# Patient Record
Sex: MALE | Race: WHITE | NOT HISPANIC OR LATINO | Employment: FULL TIME | URBAN - NONMETROPOLITAN AREA
[De-identification: names, ages, dates, MRNs, and addresses within clinical notes are randomized per-mention and may not be internally consistent; named-entity substitution may affect disease eponyms.]

---

## 2017-02-02 ENCOUNTER — APPOINTMENT (OUTPATIENT)
Dept: OCCUPATIONAL MEDICINE | Facility: OTHER | Age: 32
End: 2017-02-02

## 2017-02-02 PROCEDURE — 99000 SPECIMEN HANDLING OFFICE-LAB: CPT

## 2018-02-26 ENCOUNTER — OFFICE VISIT (OUTPATIENT)
Dept: FAMILY MEDICINE | Facility: OTHER | Age: 33
End: 2018-02-26
Attending: NURSE PRACTITIONER
Payer: COMMERCIAL

## 2018-02-26 VITALS
TEMPERATURE: 96.6 F | HEART RATE: 61 BPM | WEIGHT: 224 LBS | HEIGHT: 70 IN | SYSTOLIC BLOOD PRESSURE: 110 MMHG | BODY MASS INDEX: 32.07 KG/M2 | OXYGEN SATURATION: 96 % | DIASTOLIC BLOOD PRESSURE: 66 MMHG

## 2018-02-26 DIAGNOSIS — J02.9 VIRAL PHARYNGITIS: Primary | ICD-10-CM

## 2018-02-26 LAB
DEPRECATED S PYO AG THROAT QL EIA: NORMAL
SPECIMEN SOURCE: NORMAL

## 2018-02-26 PROCEDURE — 87081 CULTURE SCREEN ONLY: CPT | Performed by: NURSE PRACTITIONER

## 2018-02-26 PROCEDURE — 99213 OFFICE O/P EST LOW 20 MIN: CPT | Performed by: NURSE PRACTITIONER

## 2018-02-26 PROCEDURE — 87880 STREP A ASSAY W/OPTIC: CPT | Performed by: NURSE PRACTITIONER

## 2018-02-26 ASSESSMENT — ANXIETY QUESTIONNAIRES
GAD7 TOTAL SCORE: 0
7. FEELING AFRAID AS IF SOMETHING AWFUL MIGHT HAPPEN: NOT AT ALL
6. BECOMING EASILY ANNOYED OR IRRITABLE: NOT AT ALL
3. WORRYING TOO MUCH ABOUT DIFFERENT THINGS: NOT AT ALL
2. NOT BEING ABLE TO STOP OR CONTROL WORRYING: NOT AT ALL
IF YOU CHECKED OFF ANY PROBLEMS ON THIS QUESTIONNAIRE, HOW DIFFICULT HAVE THESE PROBLEMS MADE IT FOR YOU TO DO YOUR WORK, TAKE CARE OF THINGS AT HOME, OR GET ALONG WITH OTHER PEOPLE: NOT DIFFICULT AT ALL
5. BEING SO RESTLESS THAT IT IS HARD TO SIT STILL: NOT AT ALL
1. FEELING NERVOUS, ANXIOUS, OR ON EDGE: NOT AT ALL

## 2018-02-26 ASSESSMENT — PAIN SCALES - GENERAL: PAINLEVEL: MILD PAIN (3)

## 2018-02-26 ASSESSMENT — PATIENT HEALTH QUESTIONNAIRE - PHQ9: 5. POOR APPETITE OR OVEREATING: NOT AT ALL

## 2018-02-26 NOTE — PROGRESS NOTES
"  SUBJECTIVE:   Casey Garcia is a 32 year old male who presents to clinic today for the following health issues:    RESPIRATORY SYMPTOMS      Duration: Started Friday    Description  sore throat    Severity: mild    Accompanying signs and symptoms: Chills on Saturday    History (predisposing factors):  none    Precipitating or alleviating factors: None    Therapies tried and outcome:  rest and fluids          Problem list and histories reviewed & adjusted, as indicated.  Additional history: as documented    Patient Active Problem List   Diagnosis     ACP (advance care planning)     No past surgical history on file.    Social History   Substance Use Topics     Smoking status: Current Every Day Smoker     Packs/day: 0.50     Smokeless tobacco: Not on file     Alcohol use Not on file     No family history on file.      No current outpatient prescriptions on file.     No Known Allergies    Reviewed and updated as needed this visit by clinical staff  Allergies  Meds       Reviewed and updated as needed this visit by Provider         ROS:  CONSTITUTIONAL: NEGATIVE for fever, chills, change in weight  INTEGUMENTARY/SKIN: NEGATIVE for worrisome rashes, moles or lesions  ENT/MOUTH: sore throat  RESP:Saturdday SOB - cleared now  CV: NEGATIVE for chest pain, palpitations or peripheral edema  MUSCULOSKELETAL: Generalized body aches Saturday - cleared now    OBJECTIVE:     /66  Pulse 61  Temp 96.6  F (35.9  C) (Tympanic)  Ht 5' 10\" (1.778 m)  Wt 224 lb (101.6 kg)  SpO2 96%  BMI 32.14 kg/m2  Body mass index is 32.14 kg/(m^2).   GENERAL: alert and no distress  HENT: normal cephalic/atraumatic, ear canals and TM's normal, nose and mouth without ulcers or lesions, oropharynx clear, oral mucous membranes moist, tonsillar hypertrophy and tonsillar erythema  NECK: no adenopathy, no asymmetry, masses, or scars and thyroid normal to palpation  RESP: lungs clear to auscultation - no rales, rhonchi or wheezes  CV: " regular rate and rhythm, normal S1 S2, no S3 or S4, no murmur, click or rub, no peripheral edema and peripheral pulses strong  ABDOMEN: soft, nontender, no hepatosplenomegaly, no masses and bowel sounds normal  SKIN: no suspicious lesions or rashes  PSYCH: mentation appears normal, affect normal/bright  LYMPH: normal ant/post cervical, supraclavicular nodes    Diagnostic Test Results:  Results for orders placed or performed in visit on 02/26/18 (from the past 24 hour(s))   Rapid strep screen   Result Value Ref Range    Specimen Description Throat     Rapid Strep A Screen       NEGATIVE: No Group A streptococcal antigen detected by immunoassay, await culture report.       ASSESSMENT/PLAN:      1. Viral pharyngitis  Discussed negative rapid strep and culture pending. At this time symptomatic treatment for throat pain. Plan to notify if positive culture. Casey should follow up if no improvement or worsening symptoms. Casey agrees with plan.   - Rapid strep screen  - Beta strep group A culture        See Patient Instructions    [facility] CLIVE

## 2018-02-26 NOTE — MR AVS SNAPSHOT
After Visit Summary   2/26/2018    Casey Garcia    MRN: [id number]           Patient Information     Date Of Birth          1985        Visit Information        Provider Department      2/26/2018 8:40 AM Arlyn Ramirez APRN [facility]        Today's Diagnoses     Throat pain    -  1      Care Instructions      Self-Care for Sore Throats    Sore throats happen for many reasons, such as colds, allergies, and infections caused by viruses or bacteria. In any case, your throat becomes red and sore. Your goal for self-care is to reduce your discomfort while giving your throat a chance to heal.  Moisten and soothe your throat  Tips include the following:    Try a sip of water first thing after waking up.    Keep your throat moist by drinking 6 or more glasses of clear liquids every day.    Run a cool-air humidifier in your room overnight.    Avoid cigarette smoke.     Suck on throat lozenges, cough drops, hard candy, ice chips, or frozen fruit-juice bars. Use the sugar-free versions if your diet or medical condition requires them.  Gargle to ease irritation  Gargling every hour or 2 can ease irritation. Try gargling with 1 of these solutions:    1/4 teaspoon of salt in 1/2 cup of warm water    An over-the-counter anesthetic gargle  Use medicine for more relief  Over-the-counter medicine can reduce sore throat symptoms. Ask your pharmacist if you have questions about which medicine to use:    Ease pain with anesthetic sprays. Aspirin or an aspirin substitute also helps. Remember, never give aspirin to anyone 18 or younger, or if you are already taking blood thinners.     For sore throats caused by allergies, try antihistamines to block the allergic reaction.    Remember: unless a sore throat is caused by a bacterial infection, antibiotics won t help you.  Prevent future sore throats  Prevention tips include the following:    Stop smoking or reduce contact with secondhand  smoke. Smoke irritates the tender throat lining.    Limit contact with pets and with allergy-causing substances, such as pollen and mold.    When you re around someone with a sore throat or cold, wash your hands often to keep viruses or bacteria from spreading.    Don t strain your vocal cords.  Call your healthcare provider  Contact your healthcare provider if you have:    A temperature over 101 F (38.3 C)    White spots on the throat    Great difficulty swallowing    Trouble breathing    A skin rash    Recent exposure to someone else with strep bacteria    Severe hoarseness and swollen glands in the neck or jaw   Date Last Reviewed: 8/1/2016 2000-2017 [employer]. [address] [place], [place] [address]. All rights reserved. This information is not intended as a substitute for professional medical care. Always follow your healthcare professional's instructions.                Follow-ups after your visit        Follow-up notes from your care team     Return if symptoms worsen or fail to improve.      Who to contact     If you have questions or need follow up information about today's clinic visit or your schedule please contact [facility] directly at [phone].  Normal or non-critical lab and imaging results will be communicated to you by ABShart, letter or phone within 4 business days after the clinic has received the results. If you do not hear from us within 7 days, please contact the clinic through ABShart or phone. If you have a critical or abnormal lab result, we will notify you by phone as soon as possible.  Submit refill requests through [employer] or call your pharmacy and they will forward the refill request to us. Please allow 3 business days for your refill to be completed.          Additional Information About Your Visit        ABShart Information     [employer] lets you send messages to your doctor, view your test results, renew your prescriptions, schedule appointments  "and more. To sign up, go to [email or web address] . Click on \"Log in\" on the left side of the screen, which will take you to the Welcome page. Then click on \"Sign up Now\" on the right side of the page.     You will be asked to enter the access code listed below, as well as some personal information. Please follow the directions to create your username and password.     Your access code is: [email or web address]  Expires: 2018  9:26 AM     Your access code will  in 90 days. If you need help or a new code, please call your [facility] clinic or [phone].        Care EveryWhere ID     This is your Care EveryWhere ID. This could be used by other organizations to access your [facility] medical records  [id number]        Your Vitals Were     Pulse Temperature Height Pulse Oximetry BMI (Body Mass Index)       61 96.6  F (35.9  C) (Tympanic) 5' 10\" (1.778 m) 96% 32.14 kg/m2        Blood Pressure from Last 3 Encounters:   18 110/66   16 118/74   16 110/62    Weight from Last 3 Encounters:   18 224 lb (101.6 kg)   16 230 lb (104.3 kg)   16 230 lb (104.3 kg)              We Performed the Following     Beta strep group A culture     Rapid strep screen        Primary Care Provider    None Specified       No primary provider on file.        Equal Access to Services     CHIQUIS BROWN : Zohreh Waggoner, sonjada feliz, qajordin kaaljohanna nix . So [facility] [phone].    ATENCIÓN: Si habla español, tiene a gonzales disposición servicios gratuitos de asistencia lingüística. Llame al [phone].    We comply with applicable federal civil rights laws and [place] laws. We do not discriminate on the basis of race, color, national origin, age, disability, sex, sexual orientation, or gender identity.            Thank you!     Thank you for choosing [facility]  for your care. Our goal is always to provide you with excellent care. " Hearing back from our patients is one way we can continue to improve our services. Please take a few minutes to complete the written survey that you may receive in the mail after your visit with us. Thank you!             Your Updated Medication List - Protect others around you: Learn how to safely use, store and throw away your medicines at www.disposemymeds.org.      Notice  As of 2/26/2018  9:26 AM    You have not been prescribed any medications.

## 2018-02-26 NOTE — NURSING NOTE
"Chief Complaint   Patient presents with     Pharyngitis     Started Friday       Initial /66  Pulse 61  Temp 96.6  F (35.9  C) (Tympanic)  Ht 5' 10\" (1.778 m)  Wt 224 lb (101.6 kg)  SpO2 96%  BMI 32.14 kg/m2 Estimated body mass index is 32.14 kg/(m^2) as calculated from the following:    Height as of this encounter: 5' 10\" (1.778 m).    Weight as of this encounter: 224 lb (101.6 kg).  Medication Reconciliation: complete     Cheryl Torrez      "

## 2018-02-26 NOTE — PATIENT INSTRUCTIONS
Self-Care for Sore Throats    Sore throats happen for many reasons, such as colds, allergies, and infections caused by viruses or bacteria. In any case, your throat becomes red and sore. Your goal for self-care is to reduce your discomfort while giving your throat a chance to heal.  Moisten and soothe your throat  Tips include the following:    Try a sip of water first thing after waking up.    Keep your throat moist by drinking 6 or more glasses of clear liquids every day.    Run a cool-air humidifier in your room overnight.    Avoid cigarette smoke.     Suck on throat lozenges, cough drops, hard candy, ice chips, or frozen fruit-juice bars. Use the sugar-free versions if your diet or medical condition requires them.  Gargle to ease irritation  Gargling every hour or 2 can ease irritation. Try gargling with 1 of these solutions:    1/4 teaspoon of salt in 1/2 cup of warm water    An over-the-counter anesthetic gargle  Use medicine for more relief  Over-the-counter medicine can reduce sore throat symptoms. Ask your pharmacist if you have questions about which medicine to use:    Ease pain with anesthetic sprays. Aspirin or an aspirin substitute also helps. Remember, never give aspirin to anyone 18 or younger, or if you are already taking blood thinners.     For sore throats caused by allergies, try antihistamines to block the allergic reaction.    Remember: unless a sore throat is caused by a bacterial infection, antibiotics won t help you.  Prevent future sore throats  Prevention tips include the following:    Stop smoking or reduce contact with secondhand smoke. Smoke irritates the tender throat lining.    Limit contact with pets and with allergy-causing substances, such as pollen and mold.    When you re around someone with a sore throat or cold, wash your hands often to keep viruses or bacteria from spreading.    Don t strain your vocal cords.  Call your healthcare provider  Contact your healthcare provider if  you have:    A temperature over 101 F (38.3 C)    White spots on the throat    Great difficulty swallowing    Trouble breathing    A skin rash    Recent exposure to someone else with strep bacteria    Severe hoarseness and swollen glands in the neck or jaw   Date Last Reviewed: 8/1/2016 2000-2017 [employer]. [address] [place], [place] [address]. All rights reserved. This information is not intended as a substitute for professional medical care. Always follow your healthcare professional's instructions.

## 2018-02-27 ASSESSMENT — PATIENT HEALTH QUESTIONNAIRE - PHQ9: SUM OF ALL RESPONSES TO PHQ QUESTIONS 1-9: 0

## 2018-02-27 ASSESSMENT — ANXIETY QUESTIONNAIRES: GAD7 TOTAL SCORE: 0

## 2018-02-28 LAB
BACTERIA SPEC CULT: NORMAL
SPECIMEN SOURCE: NORMAL

## 2019-05-19 ENCOUNTER — HOSPITAL ENCOUNTER (EMERGENCY)
Facility: HOSPITAL | Age: 34
Discharge: HOME | End: 2019-05-19
Attending: NURSE PRACTITIONER | Admitting: NURSE PRACTITIONER
Payer: COMMERCIAL

## 2019-05-19 ENCOUNTER — APPOINTMENT (OUTPATIENT)
Dept: GENERAL RADIOLOGY | Facility: HOSPITAL | Age: 34
End: 2019-05-19
Attending: NURSE PRACTITIONER
Payer: COMMERCIAL

## 2019-05-19 VITALS
TEMPERATURE: 98.1 F | DIASTOLIC BLOOD PRESSURE: 104 MMHG | RESPIRATION RATE: 16 BRPM | SYSTOLIC BLOOD PRESSURE: 136 MMHG | OXYGEN SATURATION: 96 %

## 2019-05-19 DIAGNOSIS — M79.672 ACUTE PAIN OF LEFT FOOT: ICD-10-CM

## 2019-05-19 LAB
BASOPHILS # BLD AUTO: 0.1 10E9/L (ref 0–0.2)
BASOPHILS NFR BLD AUTO: 0.5 %
CRP SERPL-MCNC: 7 MG/L (ref 0–8)
DIFFERENTIAL METHOD BLD: NORMAL
EOSINOPHIL # BLD AUTO: 0.2 10E9/L (ref 0–0.7)
EOSINOPHIL NFR BLD AUTO: 1.4 %
ERYTHROCYTE [DISTWIDTH] IN BLOOD BY AUTOMATED COUNT: 12.6 % (ref 10–15)
HCT VFR BLD AUTO: 47.8 % (ref 40–53)
HGB BLD-MCNC: 16.5 G/DL (ref 13.3–17.7)
IMM GRANULOCYTES # BLD: 0 10E9/L (ref 0–0.4)
IMM GRANULOCYTES NFR BLD: 0.3 %
LYMPHOCYTES # BLD AUTO: 2.6 10E9/L (ref 0.8–5.3)
LYMPHOCYTES NFR BLD AUTO: 23.4 %
MCH RBC QN AUTO: 30 PG (ref 26.5–33)
MCHC RBC AUTO-ENTMCNC: 34.5 G/DL (ref 31.5–36.5)
MCV RBC AUTO: 87 FL (ref 78–100)
MONOCYTES # BLD AUTO: 0.8 10E9/L (ref 0–1.3)
MONOCYTES NFR BLD AUTO: 7 %
NEUTROPHILS # BLD AUTO: 7.4 10E9/L (ref 1.6–8.3)
NEUTROPHILS NFR BLD AUTO: 67.4 %
NRBC # BLD AUTO: 0 10*3/UL
NRBC BLD AUTO-RTO: 0 /100
PLATELET # BLD AUTO: 238 10E9/L (ref 150–450)
RBC # BLD AUTO: 5.5 10E12/L (ref 4.4–5.9)
URATE SERPL-MCNC: 8.9 MG/DL (ref 3.5–7.2)
WBC # BLD AUTO: 11 10E9/L (ref 4–11)

## 2019-05-19 PROCEDURE — 73630 X-RAY EXAM OF FOOT: CPT | Mod: TC,LT

## 2019-05-19 PROCEDURE — G0463 HOSPITAL OUTPT CLINIC VISIT: HCPCS

## 2019-05-19 PROCEDURE — 99213 OFFICE O/P EST LOW 20 MIN: CPT | Mod: Z6 | Performed by: NURSE PRACTITIONER

## 2019-05-19 PROCEDURE — 84550 ASSAY OF BLOOD/URIC ACID: CPT | Performed by: NURSE PRACTITIONER

## 2019-05-19 PROCEDURE — 36415 COLL VENOUS BLD VENIPUNCTURE: CPT | Performed by: NURSE PRACTITIONER

## 2019-05-19 PROCEDURE — 86140 C-REACTIVE PROTEIN: CPT | Performed by: NURSE PRACTITIONER

## 2019-05-19 PROCEDURE — 85025 COMPLETE CBC W/AUTO DIFF WBC: CPT | Performed by: NURSE PRACTITIONER

## 2019-05-19 ASSESSMENT — ENCOUNTER SYMPTOMS
HEADACHES: 0
ACTIVITY CHANGE: 1
FEVER: 0
CHILLS: 0
COUGH: 0
FATIGUE: 0

## 2019-05-19 NOTE — DISCHARGE INSTRUCTIONS
Ibuprofen 800 mg every 8 hours for the next 48-72 hours, then as needed. Continue to ice and elevate. Wear supportive shoes when up and about.     Follow up in clinic if pain is not improving after 2-3 days, sooner if worsening.

## 2019-05-19 NOTE — ED TRIAGE NOTES
Pt presents today alone for c/o left foot pain since about 6 am, no known injury he is questioning gout.

## 2019-05-19 NOTE — ED PROVIDER NOTES
History     Chief Complaint   Patient presents with     Foot Pain     left sided. unsure of injury or gout. no hx of gout but family hx      The history is provided by the patient. No  was used.     Casey Garcia is a 33 year old male who woke this morning with pain at the base of his second tow on his right foot. He was playing volleyball yesterday, but does not recall any injury yesterday. The foot is swollen. He iced it at home and took 800 mg of ibuprofen this morning about 6:30, but it helped just a little.     Family history of gout.    Allergies:  No Known Allergies    Problem List:    Patient Active Problem List    Diagnosis Date Noted     ACP (advance care planning) 05/03/2016     Priority: Medium     Advance Care Planning 5/3/2016: ACP Review of Chart / Resources Provided:  Reviewed chart for advance care plan.  Casey Garcia has no plan or code status on file. Discussed available resources and provided with information. Confirmed code status reflects current choices pending further ACP discussions.  Confirmed/documented legally designated decision makers.  Added by Consuelo Adrian                Past Medical History:    No past medical history on file.    Past Surgical History:    No past surgical history on file.    Family History:    No family history on file.    Social History:  Marital Status:   [2]  Social History     Tobacco Use     Smoking status: Current Every Day Smoker     Packs/day: 0.50   Substance Use Topics     Alcohol use: Not on file     Drug use: Not on file        Medications:      No current outpatient medications on file.      Review of Systems   Constitutional: Positive for activity change (due to foot pain). Negative for chills, fatigue and fever.   HENT: Negative.    Respiratory: Negative for cough.    Cardiovascular: Negative for chest pain.   Musculoskeletal: Positive for gait problem (pain and swelling in left foot).   Neurological: Negative  for headaches.       Physical Exam   BP: (!) 136/104  Heart Rate: 76  Temp: 98.1  F (36.7  C)  Resp: 16  SpO2: 96 %      Physical Exam   Constitutional: He appears well-developed and well-nourished. No distress.   Musculoskeletal:        Left foot: There is tenderness and swelling. There is normal range of motion, no bony tenderness, normal capillary refill and no deformity.        Feet:    Erythematous patch at base of 2nd and 3rd toes, left foot, surrounded by soft edema   Skin: He is not diaphoretic.       ED Course     Procedures      Results for orders placed or performed during the hospital encounter of 05/19/19 (from the past 24 hour(s))   CBC with platelets differential   Result Value Ref Range    WBC 11.0 4.0 - 11.0 10e9/L    RBC Count 5.50 4.4 - 5.9 10e12/L    Hemoglobin 16.5 13.3 - 17.7 g/dL    Hematocrit 47.8 40.0 - 53.0 %    MCV 87 78 - 100 fl    MCH 30.0 26.5 - 33.0 pg    MCHC 34.5 31.5 - 36.5 g/dL    RDW 12.6 10.0 - 15.0 %    Platelet Count 238 150 - 450 10e9/L    Diff Method Automated Method     % Neutrophils 67.4 %    % Lymphocytes 23.4 %    % Monocytes 7.0 %    % Eosinophils 1.4 %    % Basophils 0.5 %    % Immature Granulocytes 0.3 %    Nucleated RBCs 0 0 /100    Absolute Neutrophil 7.4 1.6 - 8.3 10e9/L    Absolute Lymphocytes 2.6 0.8 - 5.3 10e9/L    Absolute Monocytes 0.8 0.0 - 1.3 10e9/L    Absolute Eosinophils 0.2 0.0 - 0.7 10e9/L    Absolute Basophils 0.1 0.0 - 0.2 10e9/L    Abs Immature Granulocytes 0.0 0 - 0.4 10e9/L    Absolute Nucleated RBC 0.0    CRP inflammation   Result Value Ref Range    CRP Inflammation 7.0 0.0 - 8.0 mg/L   Uric acid   Result Value Ref Range    Uric Acid 8.9 (H) 3.5 - 7.2 mg/dL   Foot XR, G/E 3 views, left    Narrative    Exam: XR FOOT LT G/E 3 VW     History:Male, age 33 years, Onset of pain at base of 2nd and 3rd toes  this morning after playing volleyball.    Comparison:  None    Technique: Three views are submitted.    Findings: Bones are normally mineralized. No  evidence of acute or  subacute fracture.  No evidence of dislocation.           Impression    Impression:  No evidence of acute or subacute bony abnormality.    DEBBIE RED MD       Medications - No data to display    Assessments & Plan (with Medical Decision Making)     I have reviewed the nursing notes.    I have reviewed the findings, diagnosis, plan and need for follow up with the patient.   Uric acid resulted after Casey left ; called with result. Possible gout vs repetitive stress causing inflammation. Antiinflammatories (800 mg ibuprofen every 8 hours) for the next 48-72 hours. Contact clinic tomorrow morning for a follow up appointment. Casey is in agreement with the plan and discharged home in stable condition.       Medication List      There are no discharge medications for this visit.         Final diagnoses:   Acute pain of left foot       5/19/2019   [facility]     Thelma Paul, APRN CNP  05/19/19 0506

## 2019-05-19 NOTE — ED AVS SNAPSHOT
[facility]  [address] [address]  Phone:  [phone]                                    Casey Garcia   MRN: [id number]    Department:  [facility]   Date of Visit:  5/19/2019           After Visit Summary Signature Page    I have received my discharge instructions, and my questions have been answered. I have discussed any challenges I see with this plan with the nurse or doctor.    ..........................................................................................................................................  Patient/Patient Representative Signature      ..........................................................................................................................................  Patient Representative Print Name and Relationship to Patient    ..................................................               ................................................  Date                                   Time    ..........................................................................................................................................  Reviewed by Signature/Title    ...................................................              ..............................................  Date                                               Time          22EPIC Rev 08/18

## 2019-07-08 ENCOUNTER — HOSPITAL ENCOUNTER (EMERGENCY)
Facility: HOSPITAL | Age: 34
Discharge: HOME | End: 2019-07-08
Attending: NURSE PRACTITIONER | Admitting: NURSE PRACTITIONER
Payer: COMMERCIAL

## 2019-07-08 ENCOUNTER — APPOINTMENT (OUTPATIENT)
Dept: GENERAL RADIOLOGY | Facility: HOSPITAL | Age: 34
End: 2019-07-08
Attending: NURSE PRACTITIONER
Payer: COMMERCIAL

## 2019-07-08 VITALS
TEMPERATURE: 97.1 F | SYSTOLIC BLOOD PRESSURE: 139 MMHG | BODY MASS INDEX: 32.28 KG/M2 | OXYGEN SATURATION: 97 % | RESPIRATION RATE: 16 BRPM | DIASTOLIC BLOOD PRESSURE: 88 MMHG | WEIGHT: 225 LBS

## 2019-07-08 DIAGNOSIS — S62.327A DISPLACED FRACTURE OF SHAFT OF FIFTH METACARPAL BONE, LEFT HAND, INITIAL ENCOUNTER FOR CLOSED FRACTURE: ICD-10-CM

## 2019-07-08 PROCEDURE — 27110043 ZZ H CAST/SPLINT FIBERGLASS

## 2019-07-08 PROCEDURE — 29125 APPL SHORT ARM SPLINT STATIC: CPT

## 2019-07-08 PROCEDURE — 40000268 ZZH STATISTIC NO CHARGES

## 2019-07-08 PROCEDURE — 73130 X-RAY EXAM OF HAND: CPT | Mod: TC,LT

## 2019-07-08 PROCEDURE — 29125 APPL SHORT ARM SPLINT STATIC: CPT | Mod: Z6 | Performed by: NURSE PRACTITIONER

## 2019-07-08 ASSESSMENT — ENCOUNTER SYMPTOMS
APPETITE CHANGE: 0
FEVER: 0
WEAKNESS: 0
NUMBNESS: 0
CHILLS: 0
DYSURIA: 0
ACTIVITY CHANGE: 1
COUGH: 0
PSYCHIATRIC NEGATIVE: 1

## 2019-07-08 NOTE — DISCHARGE INSTRUCTIONS
Take tylenol and/or ibuprofen for pain. Follow dosing on package.   Apply ice to left hand for 20 minutes every 1-2 hours. Protect skin.   Elevate left arm as much as able.   See RICE handout.   Keep splint clean and dry to left hand.    Work note.  An orthopedic consult has been ordered. They will be calling you.   Follow up with PCP with any increase in symptoms or concerns.   Return to urgent care or emergency department with any increase in symptoms or concerns.

## 2019-07-08 NOTE — ED TRIAGE NOTES
C/o L hand injury  From Saturday when going down a slip and slide. Noticeable swelling. States 5th digit tenderness and pain

## 2019-07-08 NOTE — ED PROVIDER NOTES
History     Chief Complaint   Patient presents with     Hand Pain     The history is provided by the patient. No  was used.     Casey Garcia is a 33 year old male who presents with left hand pain. He was using a slip and slide 2 days ago when he felt a pull in his hand. He's taken ibuprofen and applied ice with mild effectiveness. Denies fever, chills, or night sweats. Eating and drinking well. Bowel and bladder are working well. He is right hand dominant.     No previous injury to left hand.       Allergies:  No Known Allergies    Problem List:    Patient Active Problem List    Diagnosis Date Noted     ACP (advance care planning) 05/03/2016     Priority: Medium     Advance Care Planning 5/3/2016: ACP Review of Chart / Resources Provided:  Reviewed chart for advance care plan.  Casey Garcia has no plan or code status on file. Discussed available resources and provided with information. Confirmed code status reflects current choices pending further ACP discussions.  Confirmed/documented legally designated decision makers.  Added by Consuelo Adrian                Past Medical History:    No past medical history on file.    Past Surgical History:    No past surgical history on file.    Family History:    No family history on file.    Social History:  Marital Status:   [2]  Social History     Tobacco Use     Smoking status: Current Every Day Smoker     Packs/day: 0.50   Substance Use Topics     Alcohol use: Not on file     Drug use: Not on file        Medications:      No current outpatient medications on file.      Review of Systems   Constitutional: Positive for activity change. Negative for appetite change, chills and fever.   Respiratory: Negative for cough.    Genitourinary: Negative for dysuria.   Musculoskeletal:        Left hand pain with swelling.    Skin: Negative for rash.   Neurological: Negative for weakness and numbness.   Psychiatric/Behavioral: Negative.         Physical Exam   BP: 139/88  Heart Rate: 74  Temp: 97.1  F (36.2  C)  Resp: 16  Weight: 102.1 kg (225 lb)  SpO2: 97 %      Physical Exam   Constitutional: He is oriented to person, place, and time. He appears well-developed and well-nourished. No distress.   HENT:   Head: Normocephalic.   Mouth/Throat: Oropharynx is clear and moist.   Neck: Normal range of motion. Neck supple.   Cardiovascular: Normal rate, regular rhythm and normal heart sounds.   No murmur heard.  Pulmonary/Chest: Effort normal. No stridor. No respiratory distress. He has no wheezes. He has no rales.   Abdominal: Soft. He exhibits no distension.   Musculoskeletal: He exhibits edema and tenderness. He exhibits no deformity.   CMS and guarded ROM intact to left hand. Left radial pulse +2. Extension and flexion intact to all digits on left hand. Swelling and tenderness along left fifth metacarpal.    Lymphadenopathy:     He has no cervical adenopathy.   Neurological: He is alert and oriented to person, place, and time. He exhibits normal muscle tone.   Skin: Skin is warm and dry. Capillary refill takes less than 2 seconds. No rash noted. He is not diaphoretic. No erythema.   Psychiatric: He has a normal mood and affect. His behavior is normal.   Nursing note and vitals reviewed.      ED Course     Splint application  Performed by: Basia Wiseman NP  Authorized by: Basia Wiseman NP   Consent: Verbal consent obtained.  Risks and benefits: risks, benefits and alternatives were discussed  Consent given by: patient  Patient understanding: patient states understanding of the procedure being performed  Imaging studies: imaging studies available  Patient identity confirmed: verbally with patient  Location details: left hand  Splint type: ulnar gutter  Supplies used: Ortho-Glass,  elastic bandage and cotton padding  Post-procedure: The splinted body part was neurovascularly unchanged following the procedure.  Patient tolerance: Patient  tolerated the procedure well with no immediate complications          I personally reviewed the x-rays and there is a left fifth metacarpal slightly displaced fracture. NO dislocation. Radiology review pending and nurse will notify patient if there is any change in the treatment plan.    Results for orders placed or performed during the hospital encounter of 07/08/19   XR Hand Left 3 Views    Narrative    PROCEDURE:  XR HAND LT G/E 3 VW    HISTORY: hand injury on Saturday. swelling. 5th digit pain    COMPARISON:  None.    TECHNIQUE:  3 views of the left hand were obtained.    FINDINGS:  There is an oblique, mildly displaced fracture of the mid  fifth metacarpal with displacement of the distal fracture fragment  radially by 3 mm. No additional fracture or dislocation.       Impression    IMPRESSION: Mildly displaced fifth metacarpal fracture.      MONIQUE MONTES MD       Assessments & Plan (with Medical Decision Making)     Consulted with ROMEL Santos who is working in the ER today. He agrees with plan of care.     iscussed plan of care. He verbalized understanding. All questions answered.     I have reviewed the nursing notes.    I have reviewed the findings, diagnosis, plan and need for follow up with the patient.  Discharged in stable condition.        Medication List      There are no discharge medications for this visit.         Final diagnoses:   Displaced fracture of shaft of fifth metacarpal bone, left hand, initial encounter for closed fracture     Take tylenol and/or ibuprofen for pain. Follow dosing on package.   Apply ice to left hand for 20 minutes every 1-2 hours. Protect skin.   Elevate left arm as much as able.   See RICE handout.   Keep splint clean and dry to left hand.    Work note.  An orthopedic consult has been ordered. They will be calling you.   Follow up with PCP with any increase in symptoms or concerns.   Return to urgent care or emergency department with any increase in symptoms or  concerns.     Basia MANUEL, TERRENCE  7/8/2019  4:10 PM  [facility]       Basia Wiseman NP  07/09/19 [id number]       Basia Wiseman NP  07/09/19 2241

## 2019-07-08 NOTE — ED TRIAGE NOTES
"Pt stated on Saturday he went down a slipped and slide and felt something get \"teaked\" and pain has continued.  "

## 2019-07-08 NOTE — LETTER
[facility]  [address]  [place] [address]  Phone: [phone]    July 8, 2019        Casey Garcia  [address]  [place] [address]          To whom it may concern:    RE: Casey Garcia    Patient was seen and treated today at our clinic.    No work until cleared by orthopedics.      Sincerely,      TERRENCE Hines  7/8/2019  5:08 PM  [facility]

## 2019-07-26 DIAGNOSIS — S62.357D CLOSED NONDISPLACED FRACTURE OF SHAFT OF FIFTH METACARPAL BONE OF LEFT HAND WITH ROUTINE HEALING, SUBSEQUENT ENCOUNTER: Primary | ICD-10-CM

## 2019-07-29 ENCOUNTER — HOSPITAL ENCOUNTER (OUTPATIENT)
Dept: GENERAL RADIOLOGY | Facility: OTHER | Age: 34
Discharge: HOME | End: 2019-07-29
Attending: ORTHOPAEDIC SURGERY | Admitting: ORTHOPAEDIC SURGERY
Payer: COMMERCIAL

## 2019-07-29 ENCOUNTER — OFFICE VISIT (OUTPATIENT)
Dept: ORTHOPEDICS | Facility: OTHER | Age: 34
End: 2019-07-29
Attending: ORTHOPAEDIC SURGERY
Payer: COMMERCIAL

## 2019-07-29 DIAGNOSIS — Z00.00 ROUTINE GENERAL MEDICAL EXAMINATION AT A HEALTH CARE FACILITY: Primary | ICD-10-CM

## 2019-07-29 DIAGNOSIS — S62.357D CLOSED NONDISPLACED FRACTURE OF SHAFT OF FIFTH METACARPAL BONE OF LEFT HAND WITH ROUTINE HEALING, SUBSEQUENT ENCOUNTER: ICD-10-CM

## 2019-07-29 PROCEDURE — G0463 HOSPITAL OUTPT CLINIC VISIT: HCPCS | Performed by: ORTHOPAEDIC SURGERY

## 2019-07-29 PROCEDURE — 73130 X-RAY EXAM OF HAND: CPT | Mod: LT

## 2019-08-01 NOTE — PROGRESS NOTES
VITALS:   Height:   70  Weight:   225      CHIEF COMPLAINT: Left Hand Pain    PROBLEMS:  Left hand pain (ICD-729.5) (DUQ67-Y23.642)    PATIENT REPORTED MEDICATIONS:   Patient has no noted medications.    PATIENT REPORTED ALLERGIES:   Patient has no noted allergies.    RISK FACTORS:  Tobacco use:   current every day smoker  Passive smoke exposure: No  Alcohol Use:   Yes    HISTORY OF PRESENT ILLNESS:    Casey Garcia is a 33-year-old gentleman who is now three weeks status post a fifth metacarpal fracture of his left hand.  He went down a slip-and-slide and jammed his finger, injuring his hand.  We saw him about three weeks ago at this point.  He is doing quite well with this.  We made him a molded splint and his fingers are a little bit stiff, but otherwise he has good rotation with flexion and is mildly tender to palpation on the ulnar border of his hand.  Otherwise he is doing well.    The patient's health history form dated 07/29/2019 was reviewed and signed.  Past medical history, surgical history, social history, family history, and review of systems noted.    PAST MEDICAL HISTORY:    No Past Medical History    PAST ORTHOPEDIC SURGICAL HISTORY:    No Past Orthopedic Surgical History    PAST SURGICAL HISTORY:    No Past Surgical History    FAMILY HISTORY:    Father:  Hypertension  Mother:  Allergies to Aspirin and Watermelon    SOCIAL HISTORY:   Occupation:  Employed at [employer]  Marital Status:      Alcohol Use:  Yes   Tobacco Use:  Yes, Does Not Want To Quit  Secondhand Smoke Exposure:  No  History of HIV:  No  History of Hepatitis:  No    REVIEW OF SYSTEMS:  Joint or Muscle pain: Yes  Stiffness:  Yes  Swelling:  No  Difficulty in walking: No  Cold extremities: No  Weakness of muscles: No  Rash or Itching: No  Bruising:  No  Numbness/Tingling: Yes    X-RAY:  X-ray examination today shows good interval healing of the fracture with only slight shortening versus fracture site resorption.  He is otherwise  doing well.    ASSESSMENT:    This is a 33-year-old gentleman who is now three weeks status post left fifth metacarpal fracture, doing well.    PLAN:   We will see him back in three weeks.  He is to wear his splint only when he is out doing things, otherwise I want him to start working on range of motion of the hand at home and he does not need to wear it at night.  Will see him back in three weeks.    Dictated by Elfego Berrios MD  cc:  Dr. Berrios at [facility]    D:  07/29/2019  T:  07/31/2019    Typed and/or reviewed and corrected by signing  below, and sent to the Physician for final review and signature.    This report was created using voice recording software and computer-generated templates. Although every effort has been made to review for and eliminate errors, some errors may still occur.

## 2020-09-22 NOTE — PROGRESS NOTES
"Subjective     Casey Garcia is a 34 year old male who presents to clinic today for the following health issues:    HPI       Musculoskeletal problem/pain  Onset/Duration: 2 months   Description  Location: elbow - left  Joint Swelling: no  Redness: no  Pain: YES - mild to sharp at times  Warmth: no  Intensity:  mild, moderate  Progression of Symptoms:  same and intermittent  Accompanying signs and symptoms:   Fevers: no  Numbness/tingling/weakness: YES- weakness, dropping things such as coffee cups.    History  Trauma to the area: YES  Recent illness:  no  Previous similar problem: no  Previous evaluation:  no  Precipitating or alleviating factors:  Aggravating factors include: overuse - works as a .   Therapies tried and outcome:  Tennis elbow brace, ice, heat        Patient Active Problem List   Diagnosis     ACP (advance care planning)     No past surgical history on file.    Social History     Tobacco Use     Smoking status: Current Every Day Smoker     Packs/day: 0.50     Smokeless tobacco: Never Used   Substance Use Topics     Alcohol use: Not on file     No family history on file.      No current outpatient medications on file.     No Known Allergies  No lab results found.   BP Readings from Last 3 Encounters:   09/24/20 (!) 144/72   07/08/19 139/88   05/19/19 (!) 136/104    Wt Readings from Last 3 Encounters:   09/24/20 108.9 kg (240 lb)   07/08/19 102.1 kg (225 lb)   02/26/18 101.6 kg (224 lb)                   Review of Systems   Constitutional, HEENT, cardiovascular, pulmonary, gi and gu systems are negative, except as otherwise noted.      Objective    BP (!) 144/72 (BP Location: Right arm, Patient Position: Chair, Cuff Size: Adult Regular)   Pulse 69   Temp 98.5  F (36.9  C) (Tympanic)   Ht 1.778 m (5' 10\")   Wt 108.9 kg (240 lb)   SpO2 97%   BMI 34.44 kg/m    Body mass index is 34.44 kg/m .  Physical Exam   GENERAL: healthy, alert and no distress  MS: tenderness of left lateral " "epicondyle.    PSYCH: mentation appears normal, affect normal/bright    Results for orders placed or performed in visit on 09/24/20   XR ELBOW LT 2 VW (Clinic Performed)     Status: None    Narrative    Exam: XR ELBOW LT 2 VW     History:Male, age 34 years, Left elbow pain    Comparison:  None    Technique: Two views are submitted    Findings: Bones are normally mineralized. No evidence of acute or  subacute fracture.  No evidence of dislocation.           Impression    Impression:  No evidence of acute or subacute bony abnormality.    DEBBIE RED MD             Assessment & Plan     1. Lateral epicondylitis of left elbow  Ice massage  Brace  Ibuprofen 600-800mg three times a day for 7 days then as needed  Stretching as reviewed.      2. Left elbow pain  No acute fractures.  - XR ELBOW LT 2 VW (Clinic Performed)    3. Tobacco abuse counseling  cessation encouraged         Tobacco Cessation:   reports that he has been smoking. He has been smoking about 0.50 packs per day. He has never used smokeless tobacco.        BMI:   Estimated body mass index is 34.44 kg/m  as calculated from the following:    Height as of this encounter: 1.778 m (5' 10\").    Weight as of this encounter: 108.9 kg (240 lb).      Follow-up if no improvement or any worsening       Rebekah Andres NP  [facility]    "

## 2020-09-24 ENCOUNTER — OFFICE VISIT (OUTPATIENT)
Dept: FAMILY MEDICINE | Facility: OTHER | Age: 35
End: 2020-09-24
Attending: NURSE PRACTITIONER
Payer: COMMERCIAL

## 2020-09-24 ENCOUNTER — ANCILLARY PROCEDURE (OUTPATIENT)
Dept: GENERAL RADIOLOGY | Facility: OTHER | Age: 35
End: 2020-09-24
Attending: NURSE PRACTITIONER
Payer: COMMERCIAL

## 2020-09-24 VITALS
TEMPERATURE: 98.5 F | OXYGEN SATURATION: 97 % | SYSTOLIC BLOOD PRESSURE: 144 MMHG | HEART RATE: 69 BPM | WEIGHT: 240 LBS | BODY MASS INDEX: 34.36 KG/M2 | DIASTOLIC BLOOD PRESSURE: 72 MMHG | HEIGHT: 70 IN

## 2020-09-24 DIAGNOSIS — Z71.6 TOBACCO ABUSE COUNSELING: ICD-10-CM

## 2020-09-24 DIAGNOSIS — M25.522 LEFT ELBOW PAIN: Primary | ICD-10-CM

## 2020-09-24 DIAGNOSIS — M77.12 LATERAL EPICONDYLITIS OF LEFT ELBOW: ICD-10-CM

## 2020-09-24 PROCEDURE — 73070 X-RAY EXAM OF ELBOW: CPT | Mod: TC

## 2020-09-24 PROCEDURE — 99213 OFFICE O/P EST LOW 20 MIN: CPT | Performed by: NURSE PRACTITIONER

## 2020-09-24 ASSESSMENT — PAIN SCALES - GENERAL: PAINLEVEL: NO PAIN (1)

## 2020-09-24 ASSESSMENT — MIFFLIN-ST. JEOR: SCORE: 2024.88

## 2020-09-24 NOTE — PATIENT INSTRUCTIONS
"    Assessment & Plan     1. Lateral epicondylitis of left elbow  Ice massage  Brace  Ibuprofen 600-800mg three times a day for 7 days then as needed  Stretching as reviewed.      2. Left elbow pain  No acute fractures.  - XR ELBOW LT 2 VW (Clinic Performed)    3. Tobacco abuse counseling  cessation encouraged         Tobacco Cessation:   reports that he has been smoking. He has been smoking about 0.50 packs per day. He has never used smokeless tobacco.        BMI:   Estimated body mass index is 34.44 kg/m  as calculated from the following:    Height as of this encounter: 1.778 m (5' 10\").    Weight as of this encounter: 108.9 kg (240 lb).      Follow-up if no improvement or any worsening       Rebekah Andres NP  [facility]    Patient Education     Understanding Lateral Epicondylitis    Tendons are strong bands of tissue that connect muscles to bones. Lateral epicondylitis affects the tendons that connect muscles in the forearm to the lateral epicondyle. This is the bony knob on the outer side of the elbow. The condition occurs if the extensor tendons of the wrist become red and swollen (irritated). This can cause pain in the elbow, forearm, and wrist. Because the condition is sometimes caused by playing tennis, it is also known as  tennis elbow.   How to say it  [id number]-[id number] [id number]   What causes lateral epicondylitis?  The condition most often occurs because of overuse. This can be from any activity that repeatedly puts stress on the forearm extensor muscles or tendons and wrist. For instance, playing tennis, lifting weights, cutting meat, painting, and typing can all cause the condition. Wear and tear of the tendons from aging or an injury to the tendons can also cause the condition.  Symptoms of lateral epicondylitis  The most common symptom is pain. You may feel it on the outer side of the elbow and down the back of the forearm. It may be worse when moving or using " the elbow, forearm, or wrist. You may also feel pain when gripping or lifting things.  Treatment for lateral epicondylitis  Treatments may include:    Resting the elbow, forearm, and wrist. You ll need to avoid movements that can make your symptoms worse. You also may need to avoid certain sports and types of work for a time. This helps relieve symptoms and prevent further damage to the tendons.    Changing the action that caused the problem. For instance, if the tendons were damaged from playing tennis, it may help to change your playing technique or use different equipment. This helps prevent further damage to the tendons.    Using cold packs. Putting an ice pack on the injured area can help reduce pain and swelling.    Taking pain medicines. Taking prescription or over-the-counter pain medicines may help reduce pain and swelling.      Wearing a brace. This helps reduce strain on the muscles and tendons in the forearm, which may relieve symptoms. It is very important to wear the brace properly.    Doing exercises and physical therapy. These help improve strength and range of motion in the elbow, forearm, and wrist.    Getting shots of medicine into the injured area. These may help relieve symptoms for a time.    Having surgery. This may be an option if other treatments fail to relieve symptoms. In many cases, the surgeon removes the damaged tissue.  Possible complications of lateral epicondylitis  If the tendons involved don t heal properly, symptoms may return or get worse. To help prevent this, follow your treatment plan as directed.  When to call your healthcare provider  Call your healthcare provider right away if you have any of these:    Fever of 100.4 F (38 C) or higher, or as directed    Redness, swelling, or warmth in the elbow or forearm that gets worse    Symptoms that don t get better with treatment, or get worse    New symptoms   Date Last Reviewed: 3/10/2016    [id number] [employer]  [address] [place] [address]. All rights reserved. This information is not intended as a substitute for professional medical care. Always follow your healthcare professional's instructions.

## 2020-09-24 NOTE — NURSING NOTE
"Chief Complaint   Patient presents with     Elbow Pain       Initial BP (!) 144/72 (BP Location: Right arm, Patient Position: Chair, Cuff Size: Adult Regular)   Pulse 69   Temp 98.5  F (36.9  C) (Tympanic)   Ht 1.778 m (5' 10\")   Wt 108.9 kg (240 lb)   SpO2 97%   BMI 34.44 kg/m   Estimated body mass index is 34.44 kg/m  as calculated from the following:    Height as of this encounter: 1.778 m (5' 10\").    Weight as of this encounter: 108.9 kg (240 lb).  Medication Reconciliation: complete  Nimisha Joshua LPN    "

## 2022-03-01 ENCOUNTER — HOSPITAL ENCOUNTER (EMERGENCY)
Facility: HOSPITAL | Age: 37
Discharge: HOME | End: 2022-03-01
Attending: NURSE PRACTITIONER | Admitting: NURSE PRACTITIONER
Payer: COMMERCIAL

## 2022-03-01 VITALS
OXYGEN SATURATION: 98 % | HEART RATE: 87 BPM | TEMPERATURE: 98.5 F | SYSTOLIC BLOOD PRESSURE: 150 MMHG | RESPIRATION RATE: 16 BRPM | DIASTOLIC BLOOD PRESSURE: 91 MMHG

## 2022-03-01 DIAGNOSIS — S61.411A LACERATION OF RIGHT HAND WITHOUT FOREIGN BODY, INITIAL ENCOUNTER: Primary | ICD-10-CM

## 2022-03-01 PROCEDURE — 999N000104 HC STATISTIC NO CHARGE

## 2022-03-01 PROCEDURE — 250N000011 HC RX IP 250 OP 636: Performed by: NURSE PRACTITIONER

## 2022-03-01 PROCEDURE — 12002 RPR S/N/AX/GEN/TRNK2.6-7.5CM: CPT | Performed by: NURSE PRACTITIONER

## 2022-03-01 PROCEDURE — 90471 IMMUNIZATION ADMIN: CPT | Performed by: NURSE PRACTITIONER

## 2022-03-01 PROCEDURE — 90715 TDAP VACCINE 7 YRS/> IM: CPT | Performed by: NURSE PRACTITIONER

## 2022-03-01 PROCEDURE — 12002 RPR S/N/AX/GEN/TRNK2.6-7.5CM: CPT

## 2022-03-01 RX ADMIN — CLOSTRIDIUM TETANI TOXOID ANTIGEN (FORMALDEHYDE INACTIVATED), CORYNEBACTERIUM DIPHTHERIAE TOXOID ANTIGEN (FORMALDEHYDE INACTIVATED), BORDETELLA PERTUSSIS TOXOID ANTIGEN (GLUTARALDEHYDE INACTIVATED), BORDETELLA PERTUSSIS FILAMENTOUS HEMAGGLUTININ ANTIGEN (FORMALDEHYDE INACTIVATED), BORDETELLA PERTUSSIS PERTACTIN ANTIGEN, AND BORDETELLA PERTUSSIS FIMBRIAE 2/3 ANTIGEN 0.5 ML: 5; 2; 2.5; 5; 3; 5 INJECTION, SUSPENSION INTRAMUSCULAR at 18:49

## 2022-03-01 ASSESSMENT — ENCOUNTER SYMPTOMS: WOUND: 1

## 2022-03-01 NOTE — Clinical Note
Casey Garcia was seen and treated in our emergency department on 3/1/2022.  He may return to work on 03/04/2022.       If you have any questions or concerns, please don't hesitate to call.      Mpofu, Prudence, CNP

## 2022-03-02 NOTE — DISCHARGE INSTRUCTIONS
Keep dressing in place and avoid getting wound wet for 24 hours. After that it is okay to wash your hands.    Tylenol or ibuprofen as needed for pain.    Go to your doctor or return here in 10 to 14 days to get the stitches removed.

## 2022-03-02 NOTE — ED PROVIDER NOTES
History     Chief Complaint   Patient presents with     Hand Pain     HPI  Casey Garcia is a 36 year old male who presents to urgent care for evaluation of a right hand injury. Patient tells me that he had just finished changing out a tire when he got his right hand caught in between the marisel and the spare tire. Patient has lacerations to his right palm. He is still moving his fingers with minimal difficulty. Bleeding well controlled upon arrival to this facility. Last Tdap was 2016.    Patient is right-hand dominant. Patient declines x-ray.    Allergies:  No Known Allergies    Problem List:    Patient Active Problem List    Diagnosis Date Noted     ACP (advance care planning) 05/03/2016     Priority: Medium     Advance Care Planning 5/3/2016: ACP Review of Chart / Resources Provided:  Reviewed chart for advance care plan.  Casey Garcia has no plan or code status on file. Discussed available resources and provided with information. Confirmed code status reflects current choices pending further ACP discussions.  Confirmed/documented legally designated decision makers.  Added by Consuelo Adrian                Past Medical History:    History reviewed. No pertinent past medical history.    Past Surgical History:    History reviewed. No pertinent surgical history.    Family History:    History reviewed. No pertinent family history.    Social History:  Marital Status:   [2]  Social History     Tobacco Use     Smoking status: Current Every Day Smoker     Packs/day: 1.00     Smokeless tobacco: Never Used   Substance Use Topics     Alcohol use: Yes     Comment: occ     Drug use: Never        Medications:    No current outpatient medications on file.        Review of Systems   Skin: Positive for wound.   All other systems reviewed and are negative.      Physical Exam   BP: 150/91  Pulse: 87  Temp: 98.5  F (36.9  C)  Resp: 16  SpO2: 98 %      Physical Exam  Vitals and nursing note reviewed.    Constitutional:       Appearance: Normal appearance. He is not ill-appearing or toxic-appearing.   HENT:      Head: Normocephalic.   Eyes:      Pupils: Pupils are equal, round, and reactive to light.   Cardiovascular:      Rate and Rhythm: Normal rate.   Pulmonary:      Effort: Pulmonary effort is normal.   Musculoskeletal:         General: Signs of injury present.      Right hand: Laceration and tenderness present. Normal range of motion. Normal sensation. Normal capillary refill. Normal pulse.        Hands:       Cervical back: Neck supple.      Comments: Approximately 2.5 cm laceration to right palm near her right thumb. Also has an approximately 2 cm laceration to right palm near her right index finger. Bleeding controlled. Full range of motion to all fingers. Cap refill less than 2 seconds.   Skin:     General: Skin is warm and dry.      Capillary Refill: Capillary refill takes less than 2 seconds.      Findings: Bruising present. No erythema.   Neurological:      Mental Status: He is alert and oriented to person, place, and time.         ED Course                 [facility]    -Laceration Repair    Date/Time: 3/1/2022 7:30 PM  Performed by: Stephanie Ronquillo CNP  Authorized by: Stephanie Ronquillo CNP     Risks, benefits and alternatives discussed.      ANESTHESIA (see MAR for exact dosages):     Anesthesia method:  Local infiltration    Local anesthetic:  Lidocaine 2% w/o epi  LACERATION DETAILS     Location:  Hand    Hand location:  R palm    Length (cm):  2.5    REPAIR TYPE:     Repair type:  Simple      EXPLORATION:     Hemostasis achieved with:  Direct pressure    Wound exploration: wound explored through full range of motion and entire depth of wound probed and visualized      Wound extent: muscle damage      Wound extent: no foreign body and no tendon damage      TREATMENT:     Area cleansed with:  Hibiclens and saline    Amount of cleaning:  Extensive    Irrigation solution:  Sterile saline     Irrigation volume:  1000ml    Irrigation method:  Pressure wash    Visualized foreign bodies/material removed: no      SKIN REPAIR     Repair method:  Sutures    Suture size:  3-0    Suture technique:  Simple interrupted    Number of sutures:  6    APPROXIMATION     Approximation:  Close    POST-PROCEDURE DETAILS     Dressing:  Antibiotic ointment and bulky dressing        PROCEDURE    Patient Tolerance:  [facility] tolerated the procedure well with no [facility]    -Laceration Repair    Date/Time: 3/1/2022 7:30 PM  Performed by: Stephanie Ronquillo CNP  Authorized by: Stephanie Ronquillo CNP     Risks, benefits and alternatives discussed.      ANESTHESIA (see MAR for exact dosages):     Anesthesia method:  Local infiltration    Local anesthetic:  Lidocaine 2% w/o epi  LACERATION DETAILS     Location:  Hand    Hand location:  R palm    Length (cm):  2    REPAIR TYPE:     Repair type:  Intermediate      EXPLORATION:     Hemostasis achieved with:  Direct pressure    Wound exploration: wound explored through full range of motion and entire depth of wound probed and visualized      Wound extent: muscle damage      Wound extent: fascia not violated, no foreign body, no nerve damage and no tendon damage      TREATMENT:     Area cleansed with:  Hibiclens and saline    Amount of cleaning:  Extensive    Irrigation solution:  Sterile saline    Irrigation volume:  1000ml    Irrigation method:  Pressure wash    Visualized foreign bodies/material removed: no      SKIN REPAIR     Repair method:  Sutures    Suture size:  3-0    Suture technique:  Simple interrupted    Number of sutures:  6    APPROXIMATION     Approximation:  Close    POST-PROCEDURE DETAILS     Dressing:  Antibiotic ointment, splint for protection and non-adherent dressing        PROCEDURE    Patient Tolerance:  Patient tolerated the procedure well with no immediate complications       No results found for this or any previous visit  (from the past 24 hour(s)).    Medications   Tdap (tetanus-diphtheria-acell pertussis) (ADACEL) injection 0.5 mL (0.5 mLs Intramuscular Given 3/1/22 1849)       Assessments & Plan (with Medical Decision Making)     I have reviewed the nursing notes.    36-year-old male that presented for evaluation of right hand following an injury. Patient had 2 lacerations as noted above. Laceration repairs were done (see procedure notes above). Patient tolerated well. Tdap updated today.    Discussed with patient to keep dressing in place and avoid getting wound wet for 24 hours. After that okay to wash hands and change dressing daily. Tylenol or ibuprofen as needed for pain. Observe for signs of infection and return here for reevaluation. Go to PCP or return here in 10 to 14 days for suture removal. Patient voiced understanding.  I have reviewed the findings, diagnosis, plan and need for follow up with the patient.  This document was prepared using a combination of typing and voice generated software.  While every attempt was made for accuracy, spelling and grammatical errors may exist.    New Prescriptions    No medications on file       Final diagnoses:   Laceration of right hand without foreign body, initial encounter       3/1/2022   [facility]     Mpofu, Stephanie, CNP  03/01/22 2008

## 2022-03-02 NOTE — ED TRIAGE NOTES
Patient presents to urgent care for right hand laceration in 2 spots. Patient got his hand caught between spare tire and marisel. Pain 3-4/10.

## 2022-03-30 ENCOUNTER — HOSPITAL ENCOUNTER (EMERGENCY)
Facility: HOSPITAL | Age: 37
Discharge: HOME | End: 2022-03-30
Attending: NURSE PRACTITIONER | Admitting: NURSE PRACTITIONER
Payer: COMMERCIAL

## 2022-03-30 ENCOUNTER — NURSE TRIAGE (OUTPATIENT)
Dept: NURSING | Facility: OTHER | Age: 37
End: 2022-03-30
Payer: COMMERCIAL

## 2022-03-30 ENCOUNTER — APPOINTMENT (OUTPATIENT)
Dept: GENERAL RADIOLOGY | Facility: HOSPITAL | Age: 37
End: 2022-03-30
Attending: NURSE PRACTITIONER
Payer: COMMERCIAL

## 2022-03-30 VITALS
TEMPERATURE: 98.1 F | DIASTOLIC BLOOD PRESSURE: 83 MMHG | RESPIRATION RATE: 20 BRPM | OXYGEN SATURATION: 95 % | SYSTOLIC BLOOD PRESSURE: 126 MMHG | HEART RATE: 78 BPM

## 2022-03-30 DIAGNOSIS — L03.113 CELLULITIS OF RIGHT HAND: Primary | ICD-10-CM

## 2022-03-30 PROCEDURE — 99213 OFFICE O/P EST LOW 20 MIN: CPT | Performed by: NURSE PRACTITIONER

## 2022-03-30 PROCEDURE — 73130 X-RAY EXAM OF HAND: CPT | Mod: RT

## 2022-03-30 PROCEDURE — G0463 HOSPITAL OUTPT CLINIC VISIT: HCPCS

## 2022-03-30 RX ORDER — CEPHALEXIN 500 MG/1
500 CAPSULE ORAL 4 TIMES DAILY
Qty: 28 CAPSULE | Refills: 0 | Status: SHIPPED | OUTPATIENT
Start: 2022-03-30 | End: 2022-04-06

## 2022-03-30 ASSESSMENT — ENCOUNTER SYMPTOMS
WOUND: 1
FEVER: 0
VOMITING: 0
SHORTNESS OF BREATH: 0
PSYCHIATRIC NEGATIVE: 1
NAUSEA: 0
DIARRHEA: 0
CHILLS: 0
COLOR CHANGE: 1

## 2022-03-30 NOTE — ED TRIAGE NOTES
Pt had a laceration repair a few weeks ago on right hand. Pt states everything has been healing but today he has had more pain and can not straighten his fingers out.     Royal Gonsales MSN, RN on 3/30/2022 at 4:18 PM

## 2022-03-30 NOTE — ED PROVIDER NOTES
History     Chief Complaint   Patient presents with     Hand Pain     HPI  Casey Garcia is a 36 year old male who presents to urgent care today with complaints of right hand pain and redness which started today where patient had sutures previously placed on 3/1/2022 when patient got hand caught between a marisel and spare tire.  No prophylactic antibiotics were given.  Patient had sutures removed by Dr. Haynes on 3/14/2022 and then returned back to work on Tuesday 3/15/2022. TDAP was given in urgent care on 3/1/2022.  Denies any fever, chills, nausea, vomiting, diarrhea, shortness of breath or chest pain.  No drainage.  No other concerns.    Allergies:  No Known Allergies    Problem List:    Patient Active Problem List    Diagnosis Date Noted     ACP (advance care planning) 05/03/2016     Priority: Medium     Advance Care Planning 5/3/2016: ACP Review of Chart / Resources Provided:  Reviewed chart for advance care plan.  Casey Garcia has no plan or code status on file. Discussed available resources and provided with information. Confirmed code status reflects current choices pending further ACP discussions.  Confirmed/documented legally designated decision makers.  Added by Consuelo Adrian                Past Medical History:    No past medical history on file.    Past Surgical History:    No past surgical history on file.    Family History:    No family history on file.    Social History:  Marital Status:   [2]  Social History     Tobacco Use     Smoking status: Current Every Day Smoker     Packs/day: 1.00     Smokeless tobacco: Never Used   Substance Use Topics     Alcohol use: Yes     Comment: occ     Drug use: Never        Medications:    cephALEXin (KEFLEX) 500 MG capsule      Review of Systems   Constitutional: Negative for chills and fever.   Respiratory: Negative for shortness of breath.    Cardiovascular: Negative for chest pain.   Gastrointestinal: Negative for diarrhea, nausea and  vomiting.   Musculoskeletal: Negative for gait problem.   Skin: Positive for color change (mild redness and swelling to palm of right hand) and wound (healed laceration to right palm of hand).   Psychiatric/Behavioral: Negative.      Physical Exam   BP: 126/83  Pulse: 78  Temp: 98.1  F (36.7  C)  Resp: 20  SpO2: 95 %    Physical Exam  Vitals and nursing note reviewed.   Constitutional:       General: He is not in acute distress.     Appearance: Normal appearance. He is not ill-appearing or toxic-appearing.   Cardiovascular:      Rate and Rhythm: Normal rate and regular rhythm.      Pulses: Normal pulses.      Heart sounds: Normal heart sounds.   Pulmonary:      Effort: Pulmonary effort is normal.      Breath sounds: Normal breath sounds.   Musculoskeletal:      Right wrist: Normal.      Right hand: Swelling (mild swelling and redness to palm of right hand surrounding previous laceration on 3/1/2022), laceration (healed, no drainage. ) and tenderness present. No deformity or bony tenderness. Normal range of motion. Normal strength. Normal sensation. Normal capillary refill. Normal pulse.   Skin:     General: Skin is warm and dry.      Capillary Refill: Capillary refill takes less than 2 seconds.   Neurological:      Mental Status: He is alert.   Psychiatric:         Mood and Affect: Mood normal.       ED Course     Results for orders placed or performed during the hospital encounter of 03/30/22 (from the past 24 hour(s))   XR Hand Right G/E 3 Views    Narrative    XR HAND RT G/E 3 VW    HISTORY: 36 years Male painful inside hand from index finger to thumb  to palm    COMPARISON: None    TECHNIQUE: Right hand 3 views    FINDINGS: Joint spaces are congruent, articular surfaces are smooth.  There is a healed fracture deformity of the fifth metacarpal.     Small radiopaque substances seen in the soft tissues to the ulnar and  volar side of the first metacarpophalangeal articulation. It is  uncertain if this is associated  with a recent soft tissue injury. No  soft tissue air is evident.    Joint spaces are congruent. There is no evidence of acute fracture or  dislocation.      Impression    IMPRESSION: Small radiopaque bodies or substance seen in the soft  tissues adjacent to the first metacarpophalangeal articulation as  described above. Significance of this is uncertain. No acute changes  otherwise seen.    Old healed fracture deformity of the fifth metacarpal.    BEAN JUÁREZ MD         SYSTEM ID:  [id number]       Medications - No data to display    Assessments & Plan (with Medical Decision Making)     I have reviewed the nursing notes.    I have reviewed the findings, diagnosis, plan and need for follow up with the patient.  (L03.113) Cellulitis of right hand  (primary encounter diagnosis)  Plan:   Patient ambulatory with a nontoxic appearance.  Denies any fever, chills, nausea, vomiting, diarrhea, shortness of breath or chest pain.  Full range of motion of right hand and wrist.  Patient has mild redness and swelling surrounding previous laceration on 3/1/2022 to right palm.  Has not been on any prophylactic antibiotics.  No open wounds, no drainage.  Tdap was updated on 3/1/2022.  Will treat patient with cephalexin for cellulitis surrounding healed laceration.  Right hand x-ray completed and impression shows no acute changes.  Alternate tylenol and ibuprofen as needed for pain.  Patient to follow-up with primary care provider or return urgent care-ED with any worsening in condition or additional concerns.  Patient in agreement with treatment plan.    Discharge Medication List as of 3/30/2022  5:13 PM      START taking these medications    Details   cephALEXin (KEFLEX) 500 MG capsule Take 1 capsule (500 mg) by mouth 4 times daily for 7 days, Disp-28 capsule, R-0, E-Prescribe           Final diagnoses:   Cellulitis of right hand     3/30/2022   [facility]     Tania Hernandez NP  03/30/22 5629

## 2022-03-30 NOTE — DISCHARGE INSTRUCTIONS
Cephalexin as ordered  - Take entire course of antibiotic even if you start to feel better.  - Antibiotics can cause stomach upset including nausea and diarrhea. Read your bottle or ask the pharmacist if antibiotic can be taken with food to help prevent nausea. If you have symptoms of diarrhea you can take an over-the-counter probiotic and/or increase foods with probiotics such as yogurt, [place], sauerkraut.    Return to urgent care-ED with any worsening in condition or additional concerns.

## 2022-03-30 NOTE — TELEPHONE ENCOUNTER
Pt's wife called regarding reports stitches were removed, but now he can't bend his finger and swelling is present.  No PCP. Pt does not wish to establish care.  Wife said goodbye when patient advised to ED. Unable to complete triage assessment.     Reason for Disposition    SEVERE pain (e.g., excruciating)    Additional Information    Negative: Amputation    Negative: High-pressure injection injury (e.g., from paint gun, usually work-related)    Negative: Looks like a broken bone or dislocated joint (e.g., crooked or deformed)    Negative: Skin is split open or gaping (length > 1/2 inch or 12 mm)    Negative: Cut or scrape is very deep (e.g., can see bone or tendons)    Negative: Bleeding won't stop after 10 minutes of direct pressure (using correct technique)    Negative: Dirt in the wound and not removed after 15 minutes of scrubbing    Negative: Cut with numbness (loss of sensation) of finger    Negative: Fingernail is partially torn from a crush injury (Exception: torn nail from catching it on something)    Negative: Sounds like a serious injury to the triager    Negative: Looks infected (e.g., spreading redness, pus, red streak)    Negative: Fingernail is completely torn off    Negative: Base of fingernail has popped out from under skin fold (cuticle)    Protocols used: FINGER INJURY-A-[place]

## 2022-03-30 NOTE — ED TRIAGE NOTES
Patient presents to urgent care for pain in right hand around the the laceration repair he had here a month ago. Patient states the laceration has been healing but today has had pain. Pain 2/10

## 2022-05-03 ENCOUNTER — HOSPITAL ENCOUNTER (EMERGENCY)
Facility: HOSPITAL | Age: 37
Discharge: HOME | End: 2022-05-03
Attending: NURSE PRACTITIONER | Admitting: NURSE PRACTITIONER
Payer: COMMERCIAL

## 2022-05-03 VITALS
HEART RATE: 78 BPM | SYSTOLIC BLOOD PRESSURE: 149 MMHG | RESPIRATION RATE: 16 BRPM | TEMPERATURE: 97.2 F | OXYGEN SATURATION: 95 % | DIASTOLIC BLOOD PRESSURE: 89 MMHG

## 2022-05-03 DIAGNOSIS — M10.9 GOUTY ARTHRITIS OF LEFT GREAT TOE: Primary | ICD-10-CM

## 2022-05-03 LAB
BASOPHILS # BLD AUTO: 0.1 10E3/UL (ref 0–0.2)
BASOPHILS NFR BLD AUTO: 1 %
EOSINOPHIL # BLD AUTO: 0.2 10E3/UL (ref 0–0.7)
EOSINOPHIL NFR BLD AUTO: 2 %
ERYTHROCYTE [DISTWIDTH] IN BLOOD BY AUTOMATED COUNT: 12.5 % (ref 10–15)
ERYTHROCYTE [SEDIMENTATION RATE] IN BLOOD BY WESTERGREN METHOD: 6 MM/HR (ref 0–15)
HCT VFR BLD AUTO: 46.9 % (ref 40–53)
HGB BLD-MCNC: 15.8 G/DL (ref 13.3–17.7)
IMM GRANULOCYTES # BLD: 0 10E3/UL
IMM GRANULOCYTES NFR BLD: 0 %
LYMPHOCYTES # BLD AUTO: 3.1 10E3/UL (ref 0.8–5.3)
LYMPHOCYTES NFR BLD AUTO: 32 %
MCH RBC QN AUTO: 29.7 PG (ref 26.5–33)
MCHC RBC AUTO-ENTMCNC: 33.7 G/DL (ref 31.5–36.5)
MCV RBC AUTO: 88 FL (ref 78–100)
MONOCYTES # BLD AUTO: 0.5 10E3/UL (ref 0–1.3)
MONOCYTES NFR BLD AUTO: 5 %
NEUTROPHILS # BLD AUTO: 6 10E3/UL (ref 1.6–8.3)
NEUTROPHILS NFR BLD AUTO: 60 %
NRBC # BLD AUTO: 0 10E3/UL
NRBC BLD AUTO-RTO: 0 /100
PLATELET # BLD AUTO: 231 10E3/UL (ref 150–450)
RBC # BLD AUTO: 5.32 10E6/UL (ref 4.4–5.9)
URATE SERPL-MCNC: 8.6 MG/DL (ref 3.5–7.2)
WBC # BLD AUTO: 9.8 10E3/UL (ref 4–11)

## 2022-05-03 PROCEDURE — 84550 ASSAY OF BLOOD/URIC ACID: CPT | Performed by: NURSE PRACTITIONER

## 2022-05-03 PROCEDURE — 99213 OFFICE O/P EST LOW 20 MIN: CPT | Performed by: NURSE PRACTITIONER

## 2022-05-03 PROCEDURE — 36415 COLL VENOUS BLD VENIPUNCTURE: CPT | Performed by: NURSE PRACTITIONER

## 2022-05-03 PROCEDURE — 85014 HEMATOCRIT: CPT | Performed by: NURSE PRACTITIONER

## 2022-05-03 PROCEDURE — G0463 HOSPITAL OUTPT CLINIC VISIT: HCPCS

## 2022-05-03 PROCEDURE — 85652 RBC SED RATE AUTOMATED: CPT | Performed by: NURSE PRACTITIONER

## 2022-05-03 RX ORDER — PREDNISONE 20 MG/1
TABLET ORAL
Qty: 10 TABLET | Refills: 0 | Status: SHIPPED | OUTPATIENT
Start: 2022-05-03 | End: 2022-08-30

## 2022-05-03 ASSESSMENT — ENCOUNTER SYMPTOMS
PSYCHIATRIC NEGATIVE: 1
VOMITING: 0
NAUSEA: 0
ARTHRALGIAS: 1
CHILLS: 0
WOUND: 0
DIARRHEA: 0
MYALGIAS: 1
FEVER: 0
SHORTNESS OF BREATH: 0

## 2022-05-03 NOTE — DISCHARGE INSTRUCTIONS
Prednisone as ordered    Follow gout diet as best as possible.    Return to urgent care/ED with any worsening in condition or additional concerns.

## 2022-05-03 NOTE — ED TRIAGE NOTES
Patient presents to urgent care with left great toe pain x 1 week. Patient states it his hard move and is painful, denies any injury. Patient thinks it gout.     Triage Assessment     Row Name 05/03/22 1527       Triage Assessment (Adult)    Airway WDL WDL       Respiratory WDL    Respiratory WDL WDL       Cognitive/Neuro/Behavioral WDL    Cognitive/Neuro/Behavioral WDL WDL       [place] Coma Scale    Best Eye Response 4-->(E4) spontaneous    Best Motor Response 6-->(M6) obeys commands    Best Verbal Response 5-->(V5) oriented    [place] Coma Scale Score 15

## 2022-05-03 NOTE — ED TRIAGE NOTES
Pt presents with left foot pain onset 1 week.  Pt states it his hard to move it and it is painful, denies injury.  States he thinks it is gout.

## 2022-05-03 NOTE — ED PROVIDER NOTES
History     Chief Complaint   Patient presents with     Foot Pain     HPI  Casey Garcia is a 36 year old male who presents to urgent care today (ambulatory) with complaints of left great toe pain, redness and swelling.  Patient has a history of gout.  Denies any injury or trauma.  Denies any fever, chills, nausea, vomiting, diarrhea, SOB or chest pain.  Attempted ibuprofen and aleve without any improvement.  No other concerns.     Allergies:  No Known Allergies    Problem List:    Patient Active Problem List    Diagnosis Date Noted     ACP (advance care planning) 05/03/2016     Priority: Medium     Advance Care Planning 5/3/2016: ACP Review of Chart / Resources Provided:  Reviewed chart for advance care plan.  Casey Garcia has no plan or code status on file. Discussed available resources and provided with information. Confirmed code status reflects current choices pending further ACP discussions.  Confirmed/documented legally designated decision makers.  Added by Consuelo Adrian                Past Medical History:    No past medical history on file.    Past Surgical History:    No past surgical history on file.    Family History:    No family history on file.    Social History:  Marital Status:   [2]  Social History     Tobacco Use     Smoking status: Current Every Day Smoker     Packs/day: 1.00     Smokeless tobacco: Never Used   Substance Use Topics     Alcohol use: Yes     Comment: occ     Drug use: Never        Medications:    predniSONE (DELTASONE) 20 MG tablet      Review of Systems   Constitutional: Negative for chills and fever.   Respiratory: Negative for shortness of breath.    Cardiovascular: Negative for chest pain.   Gastrointestinal: Negative for diarrhea, nausea and vomiting.   Musculoskeletal: Positive for arthralgias and myalgias. Negative for gait problem.   Skin: Negative for wound.   Psychiatric/Behavioral: Negative.      Physical Exam   BP: 149/89  Pulse: 78  Temp: 97.2  F  (36.2  C)  Resp: 16  SpO2: 95 %    Physical Exam  Vitals and nursing note reviewed.   Constitutional:       General: He is not in acute distress.     Appearance: Normal appearance. He is not ill-appearing or toxic-appearing.   Cardiovascular:      Rate and Rhythm: Normal rate and regular rhythm.      Pulses: Normal pulses.      Heart sounds: Normal heart sounds.   Pulmonary:      Effort: Pulmonary effort is normal.      Breath sounds: Normal breath sounds.   Skin:     General: Skin is warm and dry.      Capillary Refill: Capillary refill takes less than 2 seconds.      Comments: Mild redness, swelling and warmth to left great toe.   Neurological:      Mental Status: He is alert.   Psychiatric:         Mood and Affect: Mood normal.       ED Course     Results for orders placed or performed during the hospital encounter of 05/03/22 (from the past 24 hour(s))   CBC with platelets differential    Narrative    The following orders were created for panel order CBC with platelets differential.  Procedure                               Abnormality         Status                     ---------                               -----------         ------                     CBC with platelets and d...[[id number]]                      Final result                 Please view results for these tests on the individual orders.   Uric acid   Result Value Ref Range    Uric Acid 8.6 (H) 3.5 - 7.2 mg/dL   Erythrocyte sedimentation rate auto   Result Value Ref Range    Erythrocyte Sedimentation Rate 6 0 - 15 mm/hr   CBC with platelets and differential   Result Value Ref Range    WBC Count 9.8 4.0 - 11.0 10e3/uL    RBC Count 5.32 4.40 - 5.90 10e6/uL    Hemoglobin 15.8 13.3 - 17.7 g/dL    Hematocrit 46.9 40.0 - 53.0 %    MCV 88 78 - 100 fL    MCH 29.7 26.5 - 33.0 pg    MCHC 33.7 31.5 - 36.5 g/dL    RDW 12.5 10.0 - 15.0 %    Platelet Count 231 150 - 450 10e3/uL    % Neutrophils 60 %    % Lymphocytes 32 %    % Monocytes 5 %    % Eosinophils 2 %     % Basophils 1 %    % Immature Granulocytes 0 %    NRBCs per 100 WBC 0 <1 /100    Absolute Neutrophils 6.0 1.6 - 8.3 10e3/uL    Absolute Lymphocytes 3.1 0.8 - 5.3 10e3/uL    Absolute Monocytes 0.5 0.0 - 1.3 10e3/uL    Absolute Eosinophils 0.2 0.0 - 0.7 10e3/uL    Absolute Basophils 0.1 0.0 - 0.2 10e3/uL    Absolute Immature Granulocytes 0.0 <=0.4 10e3/uL    Absolute NRBCs 0.0 10e3/uL       Medications - No data to display    Assessments & Plan (with Medical Decision Making)     I have reviewed the nursing notes.    I have reviewed the findings, diagnosis, plan and need for follow up with the patient.  (M10.9) Gouty arthritis of left great toe  (primary encounter diagnosis)  Plan:   Patient ambulatory with a nontoxic appearance.  Denies any fever, chills, nausea, vomiting, diarrhea, shortness of breath or chest pain.  CBC and ESR normal and uric acid elevated indicating gout flareup.  We will start patient on short course of prednisone.  Patient to follow gout diet as able.  Follow-up with primary care provider or return to urgent care-ED with any worsening in condition or additional concerns.  Patient is in agreement with treatment plan.    Discharge Medication List as of 5/3/2022  5:11 PM      START taking these medications    Details   predniSONE (DELTASONE) 20 MG tablet Take two tablets (= 40mg) each day for 5 (five) days, Disp-10 tablet, R-0, E-Prescribe           Final diagnoses:   Gouty arthritis of left great toe     5/3/2022   [facility]     Tania Hernandez NP  05/03/22 [id number]

## 2022-05-14 ENCOUNTER — HEALTH MAINTENANCE LETTER (OUTPATIENT)
Age: 37
End: 2022-05-14

## 2022-07-21 ENCOUNTER — NURSE TRIAGE (OUTPATIENT)
Dept: FAMILY MEDICINE | Facility: OTHER | Age: 37
End: 2022-07-21

## 2022-07-21 NOTE — TELEPHONE ENCOUNTER
"Protocol advises patient to be seen within 3 days for left big toe pain/possible gout. Patient has no PCP. No available appointments today or tomorrow. Patient advised to be seen in . Patient verbalized understanding.     Reason for Disposition    Pain in the big toe joint    Additional Information    Negative: Followed a toe injury    Negative: Wound looks infected    Negative: Caused by an animal bite    Negative: Caused by frostbite    Negative: Caused by an ingrown toenail    Negative: Athlete's Foot suspected (i.e., itchy red rash in web space between toes)    Negative: Foot pain is main symptom    Negative: Foot is cool or blue in comparison to other foot    Negative: Purple or black skin on toe  (Exception: simple recalled injury with bruise)    Negative: [1] Looks infected (e.g., spreading redness, red streak, pus) AND [2] fever    Negative: Patient sounds very sick or weak to the triager    Negative: [1] SEVERE pain (e.g., excruciating, unable to do any normal activities) AND [2] not improved after 2 hours of pain medicine    Negative: [1] Redness spreading into foot or red streak into foot AND [2] no fever    Negative: Looks like a boil, infected sore, or deep ulcer    Negative: [1] Swollen toe AND [2] no fever  (Exceptions: just localized bump from bunion, corns, insect bite, sting)    Negative: Yellow pus seen in skin around toenail (cuticle area), or pus seen under toenail    Negative: Numbness in one foot (i.e., loss of sensation)    Negative: [1] MODERATE pain (e.g., interferes with normal activities, limping) AND [2] present > 3 days    Negative: Localized redness and swelling of skin around nail (i.e., cuticle area or nail fold)    Answer Assessment - Initial Assessment Questions  1. ONSET: \"When did the pain start?\"       Last night   2. LOCATION: \"Where is the pain located?\"   (e.g., around nail, entire toe, at foot joint)       Left big toe  3. PAIN: \"How bad is the pain?\"    (Scale 1-10; or " "mild, moderate, severe)    -  MILD (1-3): doesn't interfere with normal activities     -  MODERATE (4-7): interferes with normal activities (e.g., work or school) or awakens from sleep, limping     -  SEVERE (8-10): excruciating pain, unable to do any normal activities, unable to walk      4-5/10  4. APPEARANCE: \"What does the toe look like?\" (e.g., redness, swelling, bruising, pallor)      Redness and swelling on the joint   5. CAUSE: \"What do you think is causing the toe pain?\"      gout  6. OTHER SYMPTOMS: \"Do you have any other symptoms?\" (e.g., leg pain, rash, fever, numbness)      no  7. PREGNANCY: \"Is there any chance you are pregnant?\" \"When was your last menstrual period?\"      NA    Protocols used: TOE PAIN-A-AH      "

## 2022-08-30 PROBLEM — Z72.0 TOBACCO ABUSE: Status: ACTIVE | Noted: 2022-08-30

## 2022-08-30 NOTE — PROGRESS NOTES
"  Assessment & Plan     Encounter to establish care  Patient is in need of a new provider. he has been explained the role of a CNP and the fact that I do not follow patients in the hospital. he was told that should he get admitted, he would then be followed by a hospitalist. he verbalizes understanding and would like to establish a relationship now.     Lipid screening  - Lipid Profile (Chol, Trig, HDL, LDL calc)  -Will notify patient of the results when available and intervene accordingly.     Screening for diabetes mellitus  - Basic metabolic panel  -Will notify patient of the results when available and intervene accordingly.     Tobacco abuse  Cessation encouraged.     Screening for HIV (human immunodeficiency virus)  - HIV Antigen Antibody Combo; Future  - Will notify patient of the results when available and intervene accordingly.     Encounter for HCV screening test for low risk patient  - Hepatitis C Screen Reflex to HCV RNA Quant and Genotype; Future  - Will notify patient of the results when available and intervene accordingly.   6}     Nicotine/Tobacco Cessation:  He reports that he has been smoking. He has been smoking about 1.00 pack per day. He has never used smokeless tobacco.  Nicotine/Tobacco Cessation Plan:   Information offered: Patient not interested at this time      BMI:   Estimated body mass index is 33.29 kg/m  as calculated from the following:    Height as of this encounter: 1.778 m (5' 10\").    Weight as of this encounter: 105.2 kg (232 lb).   Weight management plan: Discussed healthy diet and exercise guidelines      Rachel Valladares NP  [facility] - CLIVE Peña is a 36 year old, presenting for the following health issues:  Establish Care      HPI     NEW PATIENT  At this time, past medical history, current medications, allergies and drug sensitivities, immunizations, habits and life style, family history, and social history are reviewed and updated.    Due " "for several vaccines. Declines.     Due for HIV and Hep C screening. Willing to get.     Due for lipid screening. Willing to get.     He does smoke, currently smoking 1 ppd. No interest in quitting. .       Review of Systems   CONSTITUTIONAL: NEGATIVE for fever, chills, change in weight  INTEGUMENTARY/SKIN: NEGATIVE for worrisome rashes, moles or lesions  EYES: NEGATIVE for vision changes or irritation  ENT/MOUTH: NEGATIVE for ear, mouth and throat problems  RESP: NEGATIVE for significant cough or SOB  CV: NEGATIVE for chest pain, palpitations or peripheral edema  GI: NEGATIVE for nausea, abdominal pain, heartburn, or change in bowel habits  : NEGATIVE for frequency, dysuria, or hematuria  MUSCULOSKELETAL: NEGATIVE for significant arthralgias or myalgia  NEURO: NEGATIVE for weakness, dizziness or paresthesias  ENDOCRINE: NEGATIVE for temperature intolerance, skin/hair changes  HEME: NEGATIVE for bleeding problems  PSYCHIATRIC: NEGATIVE for changes in mood or affect      Objective    /80 (BP Location: Right arm, Patient Position: Chair, Cuff Size: Adult Large)   Pulse 89   Temp 98.7  F (37.1  C) (Tympanic)   Ht 1.778 m (5' 10\")   Wt 105.2 kg (232 lb)   SpO2 95%   BMI 33.29 kg/m    Body mass index is 33.29 kg/m .  Physical Exam   GENERAL: alert, no distress and over weight  EYES: Eyes grossly normal to inspection, PERRL and conjunctivae and sclerae normal  HENT: ear canals and TM's normal, nose and mouth without ulcers or lesions  NECK: no adenopathy, no asymmetry, masses, or scars and thyroid normal to palpation  RESP: lungs clear to auscultation - no rales, rhonchi or wheezes  CV: regular rate and rhythm, normal S1 S2, no S3 or S4, no murmur, click or rub, no peripheral edema  ABDOMEN: soft, nontender, no hepatosplenomegaly, no masses and bowel sounds normal  MS: no gross musculoskeletal defects noted, no edema  NEURO: Normal strength and tone, mentation intact and speech normal  PSYCH: mentation " appears normal, affect normal/bright    Labs pending                .  ..

## 2022-09-04 ENCOUNTER — HEALTH MAINTENANCE LETTER (OUTPATIENT)
Age: 37
End: 2022-09-04

## 2022-09-08 ENCOUNTER — OFFICE VISIT (OUTPATIENT)
Dept: FAMILY MEDICINE | Facility: OTHER | Age: 37
End: 2022-09-08
Attending: NURSE PRACTITIONER
Payer: COMMERCIAL

## 2022-09-08 VITALS
BODY MASS INDEX: 33.21 KG/M2 | HEIGHT: 70 IN | TEMPERATURE: 98.7 F | OXYGEN SATURATION: 95 % | HEART RATE: 89 BPM | WEIGHT: 232 LBS | DIASTOLIC BLOOD PRESSURE: 80 MMHG | SYSTOLIC BLOOD PRESSURE: 122 MMHG

## 2022-09-08 DIAGNOSIS — Z13.220 LIPID SCREENING: ICD-10-CM

## 2022-09-08 DIAGNOSIS — Z11.4 SCREENING FOR HIV (HUMAN IMMUNODEFICIENCY VIRUS): ICD-10-CM

## 2022-09-08 DIAGNOSIS — Z13.1 SCREENING FOR DIABETES MELLITUS: ICD-10-CM

## 2022-09-08 DIAGNOSIS — Z11.59 ENCOUNTER FOR HCV SCREENING TEST FOR LOW RISK PATIENT: ICD-10-CM

## 2022-09-08 DIAGNOSIS — Z76.89 ENCOUNTER TO ESTABLISH CARE: Primary | ICD-10-CM

## 2022-09-08 DIAGNOSIS — Z72.0 TOBACCO ABUSE: ICD-10-CM

## 2022-09-08 LAB
ANION GAP SERPL CALCULATED.3IONS-SCNC: 6 MMOL/L (ref 3–14)
BUN SERPL-MCNC: 20 MG/DL (ref 7–30)
CALCIUM SERPL-MCNC: 8.8 MG/DL (ref 8.5–10.1)
CHLORIDE BLD-SCNC: 104 MMOL/L (ref 94–109)
CHOLEST SERPL-MCNC: 181 MG/DL
CO2 SERPL-SCNC: 26 MMOL/L (ref 20–32)
CREAT SERPL-MCNC: 1.05 MG/DL (ref 0.66–1.25)
FASTING STATUS PATIENT QL REPORTED: NO
GFR SERPL CREATININE-BSD FRML MDRD: >90 ML/MIN/1.73M2
GLUCOSE BLD-MCNC: 107 MG/DL (ref 70–99)
HDLC SERPL-MCNC: 29 MG/DL
LDLC SERPL CALC-MCNC: 80 MG/DL
NONHDLC SERPL-MCNC: 152 MG/DL
POTASSIUM BLD-SCNC: 3.5 MMOL/L (ref 3.4–5.3)
SODIUM SERPL-SCNC: 136 MMOL/L (ref 133–144)
TRIGL SERPL-MCNC: 362 MG/DL

## 2022-09-08 PROCEDURE — 80048 BASIC METABOLIC PNL TOTAL CA: CPT | Performed by: NURSE PRACTITIONER

## 2022-09-08 PROCEDURE — 87389 HIV-1 AG W/HIV-1&-2 AB AG IA: CPT | Performed by: NURSE PRACTITIONER

## 2022-09-08 PROCEDURE — 80061 LIPID PANEL: CPT | Performed by: NURSE PRACTITIONER

## 2022-09-08 PROCEDURE — 36415 COLL VENOUS BLD VENIPUNCTURE: CPT | Performed by: NURSE PRACTITIONER

## 2022-09-08 PROCEDURE — 99213 OFFICE O/P EST LOW 20 MIN: CPT | Performed by: NURSE PRACTITIONER

## 2022-09-08 PROCEDURE — 86803 HEPATITIS C AB TEST: CPT | Performed by: NURSE PRACTITIONER

## 2022-09-08 ASSESSMENT — PAIN SCALES - GENERAL: PAINLEVEL: NO PAIN (0)

## 2022-09-08 NOTE — LETTER
September 14, 2022      Casey Garcia  [address] [address]        Dear ,    We are writing to inform you of your test results.    {results letter list:[id number]}    Resulted Orders   Basic metabolic panel   Result Value Ref Range    Sodium 136 133 - 144 mmol/L    Potassium 3.5 3.4 - 5.3 mmol/L    Chloride 104 94 - 109 mmol/L    Carbon Dioxide (CO2) 26 20 - 32 mmol/L    Anion Gap 6 3 - 14 mmol/L    Urea Nitrogen 20 7 - 30 mg/dL    Creatinine 1.05 0.66 - 1.25 mg/dL    Calcium 8.8 8.5 - 10.1 mg/dL    Glucose 107 (H) 70 - 99 mg/dL    GFR Estimate >90 >60 mL/min/1.73m2      Comment:      Effective December 21, 2021 eGFRcr in adults is calculated using the 2021 CKD-EPI creatinine equation which includes age and gender (Talia et al., NEJM, DOI: 10.1056/LHYThl5925416)   Lipid Profile (Chol, Trig, HDL, LDL calc)   Result Value Ref Range    Cholesterol 181 <200 mg/dL    Triglycerides 362 (H) <150 mg/dL    Direct Measure HDL 29 (L) >=40 mg/dL    LDL Cholesterol Calculated 80 <=100 mg/dL    Non HDL Cholesterol 152 (H) <130 mg/dL    Patient Fasting > 8hrs? No     Narrative    Cholesterol  Desirable:  <200 mg/dL    Triglycerides  Normal:  Less than 150 mg/dL  Borderline High:  150-199 mg/dL  High:  200-499 mg/dL  Very High:  Greater than or equal to 500 mg/dL    Direct Measure HDL  Female:  Greater than or equal to 50 mg/dL   Male:  Greater than or equal to 40 mg/dL    LDL Cholesterol  Desirable:  <100mg/dL  Above Desirable:  100-129 mg/dL   Borderline High:  130-159 mg/dL   High:  160-189 mg/dL   Very High:  >= 190 mg/dL    Non HDL Cholesterol  Desirable:  130 mg/dL  Above Desirable:  130-159 mg/dL  Borderline High:  160-189 mg/dL  High:  190-219 mg/dL  Very High:  Greater than or equal to 220 mg/dL       If you have any questions or concerns, please call the clinic at the number listed above.       Sincerely,      Rachel Valladares NP

## 2022-09-08 NOTE — LETTER
September 14, 2022      Casey Garcia  [address]  [place] [address]        Dear ,    We are writing to inform you of your test results.    Kidney function normal. Glucose just slightly high, but he was not fasting and ate a granola bar prior to coming. Lipids look ok. Good cholesterol is really low. Daily exercise and taking fish oil would help increase this.   Rachel Valladares NP       Resulted Orders   Basic metabolic panel   Result Value Ref Range    Sodium 136 133 - 144 mmol/L    Potassium 3.5 3.4 - 5.3 mmol/L    Chloride 104 94 - 109 mmol/L    Carbon Dioxide (CO2) 26 20 - 32 mmol/L    Anion Gap 6 3 - 14 mmol/L    Urea Nitrogen 20 7 - 30 mg/dL    Creatinine 1.05 0.66 - 1.25 mg/dL    Calcium 8.8 8.5 - 10.1 mg/dL    Glucose 107 (H) 70 - 99 mg/dL    GFR Estimate >90 >60 mL/min/1.73m2      Comment:      Effective December 21, 2021 eGFRcr in adults is calculated using the 2021 CKD-EPI creatinine equation which includes age and gender (Talia guerrero al., NEJ, DOI: 10.1056/HFXXzu9524179)   Lipid Profile (Chol, Trig, HDL, LDL calc)   Result Value Ref Range    Cholesterol 181 <200 mg/dL    Triglycerides 362 (H) <150 mg/dL    Direct Measure HDL 29 (L) >=40 mg/dL    LDL Cholesterol Calculated 80 <=100 mg/dL    Non HDL Cholesterol 152 (H) <130 mg/dL    Patient Fasting > 8hrs? No     Narrative    Cholesterol  Desirable:  <200 mg/dL    Triglycerides  Normal:  Less than 150 mg/dL  Borderline High:  150-199 mg/dL  High:  200-499 mg/dL  Very High:  Greater than or equal to 500 mg/dL    Direct Measure HDL  Female:  Greater than or equal to 50 mg/dL   Male:  Greater than or equal to 40 mg/dL    LDL Cholesterol  Desirable:  <100mg/dL  Above Desirable:  100-129 mg/dL   Borderline High:  130-159 mg/dL   High:  160-189 mg/dL   Very High:  >= 190 mg/dL    Non HDL Cholesterol  Desirable:  130 mg/dL  Above Desirable:  130-159 mg/dL  Borderline High:  160-189 mg/dL  High:  190-219 mg/dL  Very High:  Greater than or equal  to 220 mg/dL       If you have any questions or concerns, please call the clinic at the number listed above.       Sincerely,      Rachel Valladares NP

## 2022-09-08 NOTE — NURSING NOTE
"Chief Complaint   Patient presents with     Establish Care       Initial There were no vitals taken for this visit. Estimated body mass index is 34.44 kg/m  as calculated from the following:    Height as of 9/24/20: 1.778 m (5' 10\").    Weight as of 9/24/20: 108.9 kg (240 lb).  Medication Reconciliation: complete  Kayleigh Grover LPN  "

## 2022-09-10 LAB
HCV AB SERPL QL IA: NONREACTIVE
HIV 1+2 AB+HIV1 P24 AG SERPL QL IA: NONREACTIVE

## 2022-09-12 ENCOUNTER — TELEPHONE (OUTPATIENT)
Dept: FAMILY MEDICINE | Facility: OTHER | Age: 37
End: 2022-09-12

## 2022-09-12 NOTE — TELEPHONE ENCOUNTER
Writer relayed Providers result (s) notes.  Patient relayed understanding.  No further questions or concerns at calls end.

## 2023-02-19 ENCOUNTER — HOSPITAL ENCOUNTER (EMERGENCY)
Facility: HOSPITAL | Age: 38
Discharge: HOME | End: 2023-02-19
Attending: PHYSICIAN ASSISTANT | Admitting: PHYSICIAN ASSISTANT
Payer: COMMERCIAL

## 2023-02-19 VITALS
SYSTOLIC BLOOD PRESSURE: 141 MMHG | TEMPERATURE: 98.1 F | HEART RATE: 73 BPM | OXYGEN SATURATION: 95 % | DIASTOLIC BLOOD PRESSURE: 87 MMHG | RESPIRATION RATE: 14 BRPM

## 2023-02-19 DIAGNOSIS — M10.9 ACUTE GOUT INVOLVING TOE OF RIGHT FOOT, UNSPECIFIED CAUSE: ICD-10-CM

## 2023-02-19 PROCEDURE — G0463 HOSPITAL OUTPT CLINIC VISIT: HCPCS

## 2023-02-19 PROCEDURE — 99213 OFFICE O/P EST LOW 20 MIN: CPT | Performed by: PHYSICIAN ASSISTANT

## 2023-02-19 RX ORDER — PREDNISONE 10 MG/1
TABLET ORAL
Qty: 32 TABLET | Refills: 0 | Status: SHIPPED | OUTPATIENT
Start: 2023-02-19 | End: 2023-03-01

## 2023-02-19 RX ORDER — COLCHICINE 0.6 MG/1
0.6 TABLET ORAL DAILY
Qty: 30 TABLET | Refills: 0 | Status: SHIPPED | OUTPATIENT
Start: 2023-02-19 | End: 2023-03-21

## 2023-02-19 ASSESSMENT — ENCOUNTER SYMPTOMS
CHEST TIGHTNESS: 0
COLOR CHANGE: 1
WOUND: 0
ACTIVITY CHANGE: 1
HEADACHES: 0
EYE REDNESS: 0
FATIGUE: 0
SINUS PRESSURE: 0
JOINT SWELLING: 1
FEVER: 0
ABDOMINAL PAIN: 0

## 2023-02-19 NOTE — ED PROVIDER NOTES
"  History     Chief Complaint   Patient presents with     Foot Pain     C/o rt foot pain, notes hx of gout     HPI  Casey Garcia is a 37 year old male who presents for evaluation of R great toe joint pain. History of gout only verified by elevated serum uric acid in the past. No joint aspirations. He visited the ED 5/2022 with similar concerns and remembers the prednisone took a couple of days to improve symptoms. Pain started about 3-4 days ago and has gradually worsened. He is looking for more immediate pain relief because he wants to go to work tomorrow. He has been taking ibuprofen with minimal relief. Pain causes him to limp. No acute injury. Has not tried \"gout diet\".    Allergies:  No Known Allergies    Problem List:    Patient Active Problem List    Diagnosis Date Noted     Tobacco abuse 08/30/2022     Priority: Medium     ACP (advance care planning) 05/03/2016     Priority: Medium     Advance Care Planning 5/3/2016: ACP Review of Chart / Resources Provided:  Reviewed chart for advance care plan.  Casey Garcia has no plan or code status on file. Discussed available resources and provided with information. Confirmed code status reflects current choices pending further ACP discussions.  Confirmed/documented legally designated decision makers.  Added by Consuelo Adrian                Past Medical History:    No past medical history on file.    Past Surgical History:    Past Surgical History:   Procedure Laterality Date     VASECTOMY         Family History:    Family History   Problem Relation Age of Onset     Hypertension Father      Gout Father      Colon Cancer No family hx of        Social History:  Marital Status:   [2]  Social History     Tobacco Use     Smoking status: Every Day     Packs/day: 1.00     Types: Cigarettes     Smokeless tobacco: Never   Substance Use Topics     Alcohol use: Yes     Comment: occ     Drug use: Never        Medications:    colchicine (COLCYRS) 0.6 MG " tablet  predniSONE (DELTASONE) 10 MG tablet          Review of Systems   Constitutional: Positive for activity change. Negative for fatigue and fever.   HENT: Negative for congestion and sinus pressure.    Eyes: Negative for redness.   Respiratory: Negative for chest tightness.    Cardiovascular: Negative for chest pain.   Gastrointestinal: Negative for abdominal pain.   Musculoskeletal: Positive for joint swelling.   Skin: Positive for color change. Negative for wound.   Neurological: Negative for headaches.       Physical Exam   BP: 141/87  Pulse: 73  Temp: 98.1  F (36.7  C)  Resp: 14  SpO2: 95 %      Physical Exam  Vitals and nursing note reviewed.   Constitutional:       Appearance: Normal appearance. He is normal weight.   HENT:      Head: Normocephalic and atraumatic.      Right Ear: External ear normal.      Left Ear: External ear normal.   Eyes:      Conjunctiva/sclera: Conjunctivae normal.   Pulmonary:      Effort: Pulmonary effort is normal.   Musculoskeletal:      Cervical back: Normal range of motion.   Feet:      Comments: Right first MPJ is erythematous, mildly edematous, warm and tender to palpation. ROM is restricted due to pain and swelling. Antalgic gait.  Neurological:      Mental Status: He is alert.       ED Course        No results found for this or any previous visit (from the past 24 hour(s)).    Medications - No data to display    Assessments & Plan (with Medical Decision Making)     I have reviewed the nursing notes.    I have reviewed the findings, diagnosis, plan and need for follow up with the patient.  Probable acute gout flare R first MPJ  Discussed joint aspiration and injection of steroid. He would like to avoid this at this time. Understands that this would most likely provide more immediate relief.     2 week taper of oral prednisone  Start colchicine daily x 30 days.   Gout diet handout given, strongly recommended he try this.  Ice, elevation, rest.  Follow up with primary care to  discuss and for additional refills.     Medical Decision Making  The patient's presentation is strongly suggestive of an acute and uncomplicated illness or injury.    The patient's evaluation involved:  history and exam without other MDM data elements    The patient's management involved prescription drug management (including medications given in the ED).        Discharge Medication List as of 2/19/2023 12:37 PM      START taking these medications    Details   predniSONE (DELTASONE) 10 MG tablet Take 4 tablets daily for 5 days,  take 2 tablets daily for 3 days, take 1 tablet daily for 3 days, take half a tablet for 3 days., Disp-32 tablet, R-0, E-Prescribe             Final diagnoses:   Acute gout involving toe of right foot, unspecified cause       2/19/2023   [facility]

## 2023-02-19 NOTE — ED TRIAGE NOTES
R Foot pain great toe, hx of gout, no injury to foot,  2/10 sitting 6/10 walking.    Jessa Behrman, LPN

## 2023-04-02 NOTE — PROGRESS NOTES
"  Assessment & Plan     Idiopathic gout, unspecified chronicity, unspecified site  No current flare, but he would like to have indomethacin on hand incase he has a flare. Ordered. Kidney function is normal.     If he has frequent flares, will consider starting allopurinol.     I also reviewed foods he should avoid to prevent future gout flares.       - indomethacin (INDOCIN) 50 MG capsule; Take 1 capsule (50 mg) by mouth 2 times daily (with meals)       BMI:   Estimated body mass index is 34.87 kg/m  as calculated from the following:    Height as of this encounter: 1.778 m (5' 10\").    Weight as of this encounter: 110.2 kg (243 lb).   Weight management plan: Discussed healthy diet and exercise guidelines    Rachel Valladares NP  [facility] - [facility] Peña is a 37 year old, presenting for the following health issues:  Arthritis          HPI     Concern - Gout  Onset: flares come and go; has had 2 flares in the past 2 months  Description: would like to try indomethacin; he has been on prednisone in the past and it did not work as well.   Intensity: mild  Progression of Symptoms:  No current issues  Accompanying Signs & Symptoms: typically has flares in right great toe  Previous history of similar problem: yes  Precipitating factors:        Worsened by: none  Alleviating factors:        Improved by: none  Therapies tried and outcome: None    Was in the ER in 5/2022 and 2/2023 for gout flares. Treated with steroid taper.     Does eat cheese and drink alcohol on most weekends.     Due for several vaccines. Declines.           Review of Systems   Constitutional, HEENT, cardiovascular, pulmonary, gi and gu systems are negative, except as otherwise noted.      Objective    /80 (BP Location: Left arm, Patient Position: Sitting, Cuff Size: Adult Regular)   Pulse 68   Temp 97.8  F (36.6  C) (Tympanic)   Ht 1.778 m (5' 10\")   Wt 110.2 kg (243 lb)   SpO2 97%   BMI 34.87 kg/m    Body mass " index is 34.87 kg/m .  Physical Exam   GENERAL: healthy, alert and no distress  EYES: Eyes grossly normal to inspection, PERRL and conjunctivae and sclerae normal  HENT: ear canals and TM's normal, nose and mouth without ulcers or lesions  NECK: no adenopathy, no asymmetry, masses, or scars and thyroid normal to palpation  RESP: lungs clear to auscultation - no rales, rhonchi or wheezes  CV: regular rate and rhythm, no murmur, click or rub, no peripheral edema  NEURO: Normal strength and tone, mentation intact and speech normal  PSYCH: mentation appears normal, affect normal/bright

## 2023-04-04 ENCOUNTER — OFFICE VISIT (OUTPATIENT)
Dept: FAMILY MEDICINE | Facility: OTHER | Age: 38
End: 2023-04-04
Attending: NURSE PRACTITIONER
Payer: COMMERCIAL

## 2023-04-04 VITALS
TEMPERATURE: 97.8 F | OXYGEN SATURATION: 97 % | DIASTOLIC BLOOD PRESSURE: 80 MMHG | BODY MASS INDEX: 34.79 KG/M2 | SYSTOLIC BLOOD PRESSURE: 132 MMHG | WEIGHT: 243 LBS | HEIGHT: 70 IN | HEART RATE: 68 BPM

## 2023-04-04 DIAGNOSIS — M10.00 IDIOPATHIC GOUT, UNSPECIFIED CHRONICITY, UNSPECIFIED SITE: Primary | ICD-10-CM

## 2023-04-04 PROCEDURE — 99213 OFFICE O/P EST LOW 20 MIN: CPT | Performed by: NURSE PRACTITIONER

## 2023-04-04 RX ORDER — INDOMETHACIN 50 MG/1
50 CAPSULE ORAL 2 TIMES DAILY WITH MEALS
Qty: 20 CAPSULE | Refills: 1 | Status: SHIPPED | OUTPATIENT
Start: 2023-04-04

## 2023-04-04 ASSESSMENT — PAIN SCALES - GENERAL: PAINLEVEL: NO PAIN (0)

## 2023-04-04 NOTE — PATIENT INSTRUCTIONS
Patient Education     Gout Diet  Gout is a painful condition caused by an excess of uric acid, a waste product made by the body. Uric acid forms crystals that collect in the joints. The immune response to these crystals brings on symptoms of joint pain and swelling. This is called a gout attack. Often, medicines and diet changes are combined to manage gout. Below are some guidelines for changing your diet to help you manage gout and prevent attacks. Your healthcare provider will help you determine the best eating plan for you.   Eating to manage gout  Weight loss for those who are overweight may help reduce gout attacks.  Eat less of these foods  Eating too many foods containing purines may raise the levels of uric acid in your body. This raises your risk for a gout attack. Try to limit these foods and drinks:   Alcohol, such as beer and red wine. You may be told to avoid alcohol completely.  Soft drinks that contain sugar or high fructose corn syrup  Certain fish, including anchovies, sardines, fish eggs, and herring  Shellfish  Certain meats, such as red meat, hot dogs, luncheon meats, and turkey  Organ meats, such as liver, kidneys, and sweetbreads  Legumes, such as dried beans and peas  Other high fat foods such as gravy, whole milk, and high fat cheeses  Vegetables such as asparagus, cauliflower, spinach, and mushrooms used to be thought to contribute to an increased risk for a gout attack, but recent studies show that high purine vegetables don't increase the risk for a gout attack.  Eat more of these foods  Other foods may be helpful for people with gout. Add some of these foods to your diet:  Cherries contain chemicals that may lower uric acid.  Omega fatty acids. These are found in some fatty fish such as salmon, certain oils (flax, olive, or nut), and nuts themselves. Omega fatty acids may help prevent inflammation due to gout.  Dairy products that are low-fat or fat-free, such as cheese and  yogurt  Complex carbohydrate foods, including whole grains, brown rice, oats, and beans  Coffee, in moderation  Water, approximately 64 ounces per day  Follow-up care  Follow up with your healthcare provider as advised.  When to get medical advice  Call your healthcare provider right away if any of these occur:  Return of gout symptoms, usually at night  Severe pain, swelling, and heat in a joint, especially the base of the big toe  Affected joint is hard to move  Skin of the affected joint is purple or red  Fever of 100.4 F (38 C) or higher, or as advised by your provider  Pain that doesn't get better even with prescribed medicine   Linus last reviewed this educational content on 2/1/2022 2000-2022 [employer]. All rights reserved. This information is not intended as a substitute for professional medical care. Always follow your healthcare professional's instructions.

## 2023-06-03 ENCOUNTER — HEALTH MAINTENANCE LETTER (OUTPATIENT)
Age: 38
End: 2023-06-03

## 2024-06-17 ENCOUNTER — APPOINTMENT (OUTPATIENT)
Dept: CT IMAGING | Facility: HOSPITAL | Age: 39
End: 2024-06-17
Attending: PHYSICIAN ASSISTANT
Payer: COMMERCIAL

## 2024-06-17 ENCOUNTER — HOSPITAL ENCOUNTER (EMERGENCY)
Facility: HOSPITAL | Age: 39
Discharge: HOME | End: 2024-06-17
Attending: PHYSICIAN ASSISTANT | Admitting: PHYSICIAN ASSISTANT
Payer: COMMERCIAL

## 2024-06-17 ENCOUNTER — APPOINTMENT (OUTPATIENT)
Dept: ULTRASOUND IMAGING | Facility: HOSPITAL | Age: 39
End: 2024-06-17
Attending: PHYSICIAN ASSISTANT
Payer: COMMERCIAL

## 2024-06-17 VITALS
SYSTOLIC BLOOD PRESSURE: 147 MMHG | OXYGEN SATURATION: 96 % | TEMPERATURE: 97.7 F | WEIGHT: 231.26 LBS | HEIGHT: 70 IN | HEART RATE: 75 BPM | DIASTOLIC BLOOD PRESSURE: 87 MMHG | BODY MASS INDEX: 33.11 KG/M2 | RESPIRATION RATE: 18 BRPM

## 2024-06-17 DIAGNOSIS — N43.3 HYDROCELE, UNSPECIFIED HYDROCELE TYPE: ICD-10-CM

## 2024-06-17 DIAGNOSIS — N50.812 TESTICULAR PAIN, LEFT: ICD-10-CM

## 2024-06-17 DIAGNOSIS — R10.9 ABDOMINAL PAIN OF UNKNOWN ETIOLOGY: ICD-10-CM

## 2024-06-17 LAB
ALBUMIN UR-MCNC: NEGATIVE MG/DL
APPEARANCE UR: CLEAR
BILIRUB UR QL STRIP: NEGATIVE
COLOR UR AUTO: NORMAL
GLUCOSE UR STRIP-MCNC: NEGATIVE MG/DL
HGB UR QL STRIP: NEGATIVE
KETONES UR STRIP-MCNC: NEGATIVE MG/DL
LEUKOCYTE ESTERASE UR QL STRIP: NEGATIVE
NITRATE UR QL: NEGATIVE
PH UR STRIP: 6 [PH] (ref 4.7–8)
RBC URINE: <1 /HPF
SP GR UR STRIP: 1 (ref 1–1.03)
SQUAMOUS EPITHELIAL: 0 /HPF
UROBILINOGEN UR STRIP-MCNC: NORMAL MG/DL
WBC URINE: <1 /HPF

## 2024-06-17 PROCEDURE — 99284 EMERGENCY DEPT VISIT MOD MDM: CPT | Mod: 25

## 2024-06-17 PROCEDURE — 99284 EMERGENCY DEPT VISIT MOD MDM: CPT | Performed by: PHYSICIAN ASSISTANT

## 2024-06-17 PROCEDURE — 74176 CT ABD & PELVIS W/O CONTRAST: CPT

## 2024-06-17 PROCEDURE — 81001 URINALYSIS AUTO W/SCOPE: CPT | Performed by: PHYSICIAN ASSISTANT

## 2024-06-17 PROCEDURE — 76870 US EXAM SCROTUM: CPT

## 2024-06-17 PROCEDURE — 93976 VASCULAR STUDY: CPT

## 2024-06-17 ASSESSMENT — ENCOUNTER SYMPTOMS: ROS GI COMMENTS: SEE HPI

## 2024-06-17 ASSESSMENT — COLUMBIA-SUICIDE SEVERITY RATING SCALE - C-SSRS
6. HAVE YOU EVER DONE ANYTHING, STARTED TO DO ANYTHING, OR PREPARED TO DO ANYTHING TO END YOUR LIFE?: NO
2. HAVE YOU ACTUALLY HAD ANY THOUGHTS OF KILLING YOURSELF IN THE PAST MONTH?: NO
1. IN THE PAST MONTH, HAVE YOU WISHED YOU WERE DEAD OR WISHED YOU COULD GO TO SLEEP AND NOT WAKE UP?: NO

## 2024-06-17 ASSESSMENT — ACTIVITIES OF DAILY LIVING (ADL): ADLS_ACUITY_SCORE: 35

## 2024-06-17 NOTE — ED TRIAGE NOTES
"ADRIANA Morales  assessed patient in triage and determined patient not Urgent Care appropriate. Will be seen in Emergency Department.     States that a couple hours ago started to get left lower abdominal pain and pain that runs down into his left testicle. States it feels like \"I was kicked in the balls.\"        "

## 2024-06-17 NOTE — ED NOTES
Pt reports today had some left groin pain that radiated into left testicle.  Pt stated he had a twinge of pain in left flank but mostly is in left groin and testicle.  No size or color change. Pain with any movement. No fever. No hx of kidney stones.  Hasn't taken anything for pain no nausea.

## 2024-06-18 NOTE — DISCHARGE INSTRUCTIONS
Your imaging today was unrevealing for a source of your symptoms.  You do have a small hydrocele in your left testicle that I do not believe is causing your pain.  You may take ibuprofen and Tylenol as needed.  Please follow-up in the clinic for repeat examination if symptoms persist.  Please return here for any worsening or new symptoms.

## 2024-06-18 NOTE — ED NOTES
Face to face report given with opportunity to observe patient.    Report given to Kathy CLARK RN   6/17/2024  7:19 PM

## 2024-06-18 NOTE — ED PROVIDER NOTES
"  History     Chief Complaint   Patient presents with    Groin Swelling    Flank Pain     The history is provided by the patient.     Casey Garcia is a 38 year old male who presented to the emergency department ambulatory for evaluation of left testicle as well as left lower quadrant abdominal pain.  Started today during work with lifting.  No masses.  No swelling.  No fevers.  No vomiting.  No intra-abdominal surgeries.    Allergies:  No Known Allergies    Problem List:    Patient Active Problem List    Diagnosis Date Noted    Tobacco abuse 08/30/2022     Priority: Medium    ACP (advance care planning) 05/03/2016     Priority: Medium     Advance Care Planning 5/3/2016: ACP Review of Chart / Resources Provided:  Reviewed chart for advance care plan.  Casey Garcia has no plan or code status on file. Discussed available resources and provided with information. Confirmed code status reflects current choices pending further ACP discussions.  Confirmed/documented legally designated decision makers.  Added by Consuelo Adrian                Past Medical History:    No past medical history on file.    Past Surgical History:    Past Surgical History:   Procedure Laterality Date    VASECTOMY         Family History:    Family History   Problem Relation Age of Onset    Hypertension Father     Gout Father     Colon Cancer No family hx of        Social History:  Marital Status:   [2]  Social History     Tobacco Use    Smoking status: Every Day     Current packs/day: 1.00     Types: Cigarettes    Smokeless tobacco: Never   Substance Use Topics    Alcohol use: Yes     Comment: occ    Drug use: Never        Medications:    indomethacin (INDOCIN) 50 MG capsule          Review of Systems   Gastrointestinal:         See HPI   Genitourinary:         See HPI       Physical Exam   BP: 147/87  Pulse: 75  Temp: 97.7  F (36.5  C)  Resp: 18  Height: 177.8 cm (5' 10\")  Weight: 104.9 kg (231 lb 4.2 oz)  SpO2: 96 " %      Physical Exam  Vitals and nursing note reviewed.   Constitutional:       General: He is not in acute distress.     Appearance: Normal appearance. He is normal weight. He is not ill-appearing, toxic-appearing or diaphoretic.   Cardiovascular:      Rate and Rhythm: Normal rate and regular rhythm.   Genitourinary:     Penis: Normal.       Testes: Normal.   Skin:     General: Skin is warm and dry.      Capillary Refill: Capillary refill takes less than 2 seconds.   Neurological:      General: No focal deficit present.      Mental Status: He is alert and oriented to person, place, and time.         ED Course        Procedures              Critical Care time:  none               Results for orders placed or performed during the hospital encounter of 06/17/24 (from the past 24 hour(s))   UA with Microscopic reflex to Culture    Specimen: Urine, Midstream   Result Value Ref Range    Color Urine Straw Colorless, Straw, Light Yellow, Yellow    Appearance Urine Clear Clear    Glucose Urine Negative Negative mg/dL    Bilirubin Urine Negative Negative    Ketones Urine Negative Negative mg/dL    Specific Gravity Urine 1.004 1.003 - 1.035    Blood Urine Negative Negative    pH Urine 6.0 4.7 - 8.0    Protein Albumin Urine Negative Negative mg/dL    Urobilinogen Urine Normal Normal, 2.0 mg/dL    Nitrite Urine Negative Negative    Leukocyte Esterase Urine Negative Negative    RBC Urine <1 <=2 /HPF    WBC Urine <1 <=5 /HPF    Squamous Epithelials Urine 0 <=1 /HPF    Narrative    Urine Culture not indicated   CT Abdomen Pelvis w/o Contrast    Narrative    Exam: CT ABDOMEN PELVIS W/O CONTRAST    Exam reason: Acute left lower quadrant and testicular pain    Technique: Using helical CT technique, axial images of the abdomen and  pelvis  were acquired without administration of IV contrast. Coronal  and sagittal reformats were performed. This CT was performed using one  or more of the following dose reduction techniques: automated  exposure  control, adjustment of the mA and/or kV according to patient size,  and/or use of iterative reconstruction technique.    Comparison: None.    FINDINGS:    NOTE: Noncontrast images are insensitive for detection of solid organ  abnormalities and some other types of pathology.    ABDOMEN:     Liver:  No focal mass.    Gallbladder:  No calcified gallstones.  Bile Ducts:  No significantly dilated ducts.  Spleen:  Normal size without focal abnormality.  Kidneys:  No hydronephrosis. No calculi within the kidneys, ureters,  or bladder. No definite solid mass.  Adrenals:  No nodules.  Pancreas:  No mass or peripancreatic fat stranding.   Lymph Nodes:   No significant adenopathy.   Vascular:  No aortic aneurysm.   Abdominal wall:   No acute findings.    Pelvis: No mass or adenopathy.    Bowel/Mesentery/Peritoneum:   -No evidence of bowel obstruction.   -No acute inflammatory findings.   -No ascites.    Visualized portion of the Chest: No consolidation or pleural effusion.      Musculoskeletal: No acute osseous abnormalities.       Impression    IMPRESSION:    No acute findings in the abdomen or pelvis. No hydronephrosis.    ROSALBA LOPEZ MD         SYSTEM ID:  [id number]   US Testicular & Scrotum w Doppler Ltd    Narrative    Exam: US TESTICULAR AND SCROTUM WITH DOPPLER LIMITED    Exam reason: Left testicular and inguinal pain    Technique:  A scrotal ultrasound was performed.    Comparison: 6/17/2024     Findings:    Right Scrotum:      Right testicle:  Echogenicity:  No testicular masses.  Measurements: 4 x 2.8 x  2.3 cm.   Right varicocele or hydrocele: None.  Testicular vascular exam: Color Doppler and pulse Doppler exam  demonstrates normal arterial blood inflow and venous outflow  waveforms.  Symmetric with opposite testis.    Right epididymis: Normal in size and position. Color Doppler is  normal. There is a cyst in the epididymal head measuring up to 0.8 cm.       Left Scrotum:    Left  testicle:  Echogenicity:  No testicular masses.  Measurements: 4.3 x 2.9 x 2.1 cm.   Left varicocele or hydrocele: Small left hydrocele.  Testicular vascular exam: Color Doppler and pulse Doppler exam  demonstrates normal arterial blood inflow and venous outflow  waveforms.   Symmetric with opposite testis.    Left epididymis: Normal in size and position. Color Doppler is normal.  There is a cyst in the epididymal head measuring up to 0.4 cm.             Impression    Impression:    No mass or focal abnormality of the testicles. No evidence of torsion.    Small left hydrocele.      ROSALBA LOPEZ MD         SYSTEM ID:  [id number]       Medications - No data to display    Assessments & Plan (with Medical Decision Making)   38-year-old male with left lower quadrant and left testicular pain of unknown etiology.  I do not believe the small hydrocele is the cause of his symptoms.  He otherwise looks well.  He has no fevers or tachycardia.  His exam is relatively benign.  Imaging is unremarkable.  I believe that outpatient follow-up is reasonable.  Ibuprofen and Tylenol as needed.  Long discussion.  Strict return precautions provided.  Clinic follow-up discussed.    Casey has also agreed to schedule a follow up appointment with his primary provider for re-evaluation and further management. He verbalized an understanding of the results of our workup and agrees with the complete discharge plan including instructions for return and follow up.  There is no indication for further investigation or treatment on an emergent basis.  There is no reasonably foreseeable injury that would be associated with discharge and close outpatient follow-up.      This document was prepared using a combination of typing and voice generated software.  While every attempt was made for accuracy, spelling and grammatical errors may exist.     I have reviewed the nursing notes.    I have reviewed the findings, diagnosis, plan and need for follow up  with the patient.           Medical Decision Making  The patient's presentation was of moderate complexity (an undiagnosed new problem with uncertain prognosis).    The patient's evaluation involved:  ordering and/or review of 3 test(s) in this encounter (CT, labs and ultrasound)    The patient's management necessitated only low risk treatment.        New Prescriptions    No medications on file       Final diagnoses:   Abdominal pain of unknown etiology   Testicular pain, left   Hydrocele, unspecified hydrocele type       6/17/2024   [facility]       Yoana Tyler PA-C  06/17/24 1949

## 2024-07-06 ENCOUNTER — HEALTH MAINTENANCE LETTER (OUTPATIENT)
Age: 39
End: 2024-07-06

## 2025-02-03 ENCOUNTER — OFFICE VISIT (OUTPATIENT)
Dept: FAMILY MEDICINE | Facility: OTHER | Age: 40
End: 2025-02-03
Attending: NURSE PRACTITIONER
Payer: COMMERCIAL

## 2025-02-03 ENCOUNTER — TELEPHONE (OUTPATIENT)
Dept: FAMILY MEDICINE | Facility: OTHER | Age: 40
End: 2025-02-03

## 2025-02-03 VITALS
OXYGEN SATURATION: 97 % | TEMPERATURE: 98.7 F | HEART RATE: 72 BPM | SYSTOLIC BLOOD PRESSURE: 142 MMHG | DIASTOLIC BLOOD PRESSURE: 84 MMHG | WEIGHT: 231.8 LBS | BODY MASS INDEX: 33.26 KG/M2 | RESPIRATION RATE: 18 BRPM

## 2025-02-03 DIAGNOSIS — F32.A MILD EPISODE OF DEPRESSION: ICD-10-CM

## 2025-02-03 DIAGNOSIS — Z72.0 TOBACCO ABUSE: ICD-10-CM

## 2025-02-03 DIAGNOSIS — F41.9 ANXIETY: Primary | ICD-10-CM

## 2025-02-03 DIAGNOSIS — R03.0 ELEVATED BLOOD PRESSURE READING WITHOUT DIAGNOSIS OF HYPERTENSION: ICD-10-CM

## 2025-02-03 LAB
ALBUMIN SERPL BCG-MCNC: 4.8 G/DL (ref 3.5–5.2)
ALP SERPL-CCNC: 97 U/L (ref 40–150)
ALT SERPL W P-5'-P-CCNC: 74 U/L (ref 0–70)
ANION GAP SERPL CALCULATED.3IONS-SCNC: 10 MMOL/L (ref 7–15)
AST SERPL W P-5'-P-CCNC: 56 U/L (ref 0–45)
BASOPHILS # BLD AUTO: 0 10E3/UL (ref 0–0.2)
BASOPHILS NFR BLD AUTO: 1 %
BILIRUB SERPL-MCNC: 0.8 MG/DL
BUN SERPL-MCNC: 11.6 MG/DL (ref 6–20)
CALCIUM SERPL-MCNC: 10 MG/DL (ref 8.8–10.4)
CHLORIDE SERPL-SCNC: 99 MMOL/L (ref 98–107)
CREAT SERPL-MCNC: 1.2 MG/DL (ref 0.67–1.17)
EGFRCR SERPLBLD CKD-EPI 2021: 78 ML/MIN/1.73M2
EOSINOPHIL # BLD AUTO: 0.1 10E3/UL (ref 0–0.7)
EOSINOPHIL NFR BLD AUTO: 1 %
ERYTHROCYTE [DISTWIDTH] IN BLOOD BY AUTOMATED COUNT: 12.4 % (ref 10–15)
GLUCOSE SERPL-MCNC: 112 MG/DL (ref 70–99)
HCO3 SERPL-SCNC: 28 MMOL/L (ref 22–29)
HCT VFR BLD AUTO: 49.1 % (ref 40–53)
HGB BLD-MCNC: 16.9 G/DL (ref 13.3–17.7)
IMM GRANULOCYTES # BLD: 0 10E3/UL
IMM GRANULOCYTES NFR BLD: 0 %
LYMPHOCYTES # BLD AUTO: 2.7 10E3/UL (ref 0.8–5.3)
LYMPHOCYTES NFR BLD AUTO: 32 %
MCH RBC QN AUTO: 30.2 PG (ref 26.5–33)
MCHC RBC AUTO-ENTMCNC: 34.4 G/DL (ref 31.5–36.5)
MCV RBC AUTO: 88 FL (ref 78–100)
MONOCYTES # BLD AUTO: 0.5 10E3/UL (ref 0–1.3)
MONOCYTES NFR BLD AUTO: 6 %
NEUTROPHILS # BLD AUTO: 5.2 10E3/UL (ref 1.6–8.3)
NEUTROPHILS NFR BLD AUTO: 61 %
NRBC # BLD AUTO: 0 10E3/UL
NRBC BLD AUTO-RTO: 0 /100
PLATELET # BLD AUTO: 273 10E3/UL (ref 150–450)
POTASSIUM SERPL-SCNC: 3.7 MMOL/L (ref 3.4–5.3)
PROT SERPL-MCNC: 8 G/DL (ref 6.4–8.3)
RBC # BLD AUTO: 5.59 10E6/UL (ref 4.4–5.9)
SODIUM SERPL-SCNC: 137 MMOL/L (ref 135–145)
TSH SERPL DL<=0.005 MIU/L-ACNC: 0.69 UIU/ML (ref 0.3–4.2)
WBC # BLD AUTO: 8.5 10E3/UL (ref 4–11)

## 2025-02-03 PROCEDURE — G2211 COMPLEX E/M VISIT ADD ON: HCPCS | Performed by: NURSE PRACTITIONER

## 2025-02-03 PROCEDURE — 82306 VITAMIN D 25 HYDROXY: CPT | Performed by: NURSE PRACTITIONER

## 2025-02-03 PROCEDURE — 99214 OFFICE O/P EST MOD 30 MIN: CPT | Performed by: NURSE PRACTITIONER

## 2025-02-03 PROCEDURE — 36415 COLL VENOUS BLD VENIPUNCTURE: CPT | Performed by: NURSE PRACTITIONER

## 2025-02-03 PROCEDURE — 80050 GENERAL HEALTH PANEL: CPT | Performed by: NURSE PRACTITIONER

## 2025-02-03 RX ORDER — VENLAFAXINE HYDROCHLORIDE 37.5 MG/1
37.5 CAPSULE, EXTENDED RELEASE ORAL DAILY
Qty: 90 CAPSULE | Refills: 0 | Status: SHIPPED | OUTPATIENT
Start: 2025-02-03

## 2025-02-03 ASSESSMENT — ANXIETY QUESTIONNAIRES
5. BEING SO RESTLESS THAT IT IS HARD TO SIT STILL: SEVERAL DAYS
IF YOU CHECKED OFF ANY PROBLEMS ON THIS QUESTIONNAIRE, HOW DIFFICULT HAVE THESE PROBLEMS MADE IT FOR YOU TO DO YOUR WORK, TAKE CARE OF THINGS AT HOME, OR GET ALONG WITH OTHER PEOPLE: SOMEWHAT DIFFICULT
2. NOT BEING ABLE TO STOP OR CONTROL WORRYING: NOT AT ALL
GAD7 TOTAL SCORE: 3
1. FEELING NERVOUS, ANXIOUS, OR ON EDGE: NOT AT ALL
GAD7 TOTAL SCORE: 3
3. WORRYING TOO MUCH ABOUT DIFFERENT THINGS: NOT AT ALL
8. IF YOU CHECKED OFF ANY PROBLEMS, HOW DIFFICULT HAVE THESE MADE IT FOR YOU TO DO YOUR WORK, TAKE CARE OF THINGS AT HOME, OR GET ALONG WITH OTHER PEOPLE?: SOMEWHAT DIFFICULT
6. BECOMING EASILY ANNOYED OR IRRITABLE: SEVERAL DAYS
GAD7 TOTAL SCORE: 3
4. TROUBLE RELAXING: SEVERAL DAYS
7. FEELING AFRAID AS IF SOMETHING AWFUL MIGHT HAPPEN: NOT AT ALL
7. FEELING AFRAID AS IF SOMETHING AWFUL MIGHT HAPPEN: NOT AT ALL

## 2025-02-03 ASSESSMENT — PATIENT HEALTH QUESTIONNAIRE - PHQ9
SUM OF ALL RESPONSES TO PHQ QUESTIONS 1-9: 8
SUM OF ALL RESPONSES TO PHQ QUESTIONS 1-9: 8
10. IF YOU CHECKED OFF ANY PROBLEMS, HOW DIFFICULT HAVE THESE PROBLEMS MADE IT FOR YOU TO DO YOUR WORK, TAKE CARE OF THINGS AT HOME, OR GET ALONG WITH OTHER PEOPLE: SOMEWHAT DIFFICULT

## 2025-02-03 ASSESSMENT — PAIN SCALES - GENERAL: PAINLEVEL_OUTOF10: NO PAIN (0)

## 2025-02-03 NOTE — PROGRESS NOTES
"  Assessment & Plan     (F41.9) Anxiety  (primary encounter diagnosis)  (F32.A) Mild episode of depression  Comment: anxiety and depression have worsened, but he denies thoughts of self harm  Plan: TSH with free T4 reflex, Vitamin D Deficiency,         CBC with platelets and differential,         Comprehensive metabolic panel (BMP + Alb, Alk         Phos, ALT, AST, Total. Bili, TP), venlafaxine         (EFFEXOR XR) 37.5 MG 24 hr capsule, Adult         Mental Health  Referral, CANCELED:         Adult Mental Health  Referral        Will update labs, but also start Effexor. He was made aware of the side effects. Will then reassess in 4 weeks, sooner with new or worsening symptoms. Will also place referral for counseling.     Encouraged discussion with trusted relative or friend to monitor for negative mood changes and change in behaviour during medication initiation and titration. Recommended immediate help if increased thoughts of suicide and call if significant side effects occur. Encouraged patient that this type of medication is not effective immediately, and to be consistant with taking the medication.    (Z72.0) Tobacco abuse  Plan: Cessation encouraged.     (R03.0) Elevated blood pressure reading without diagnosis of hypertension  Comment: BP elevated at 142/84, but he is anxious and has had more alcohol  Plan: He will limit his sodium and alcohol intake and we will recheck in 4 weeks. If still elevated, will consider starting medications.           Nicotine/Tobacco Cessation  He reports that he has been smoking cigarettes. He has been exposed to tobacco smoke. He has never used smokeless tobacco.  Nicotine/Tobacco Cessation Plan  Information offered: Patient not interested at this time      BMI  Estimated body mass index is 33.26 kg/m  as calculated from the following:    Height as of 6/17/24: 1.778 m (5' 10\").    Weight as of this encounter: 105.1 kg (231 lb 12.8 oz).   Weight management " plan: Discussed healthy diet and exercise guidelines      The longitudinal plan of care for the diagnosis(es)/condition(s) as documented were addressed during this visit. Due to the added complexity in care, I will continue to support Casey in the subsequent management and with ongoing continuity of care.    Return in about 4 weeks (around 3/3/2025).    Subjective   Casey is a 39 year old, presenting for the following health issues:  Depression        2/3/2025     2:42 PM   Additional Questions   Roomed by Tima Hernandez   Accompanied by Wife         2/3/2025     2:42 PM   Patient Reported Additional Medications   Patient reports taking the following new medications None     History of Present Illness       Reason for visit:  Depression   He is taking medications regularly.     [id number]}  Abnormal Mood Symptoms  Onset/Duration: Chronic   Description:   Depression (if yes, do PHQ-9): YES  Anxiety (if yes, do VIVI-7): No  Accompanying Signs & Symptoms:  Still participating in activities that you used to enjoy: No, he does not find niharika in things like he use too  Fatigue: YES  Irritability: YES  Difficulty concentrating: No  Changes in appetite: No  Problems with sleep: No  Heart racing/beating fast: No  Abnormally elevated, expansive, or irritable mood: YES  Persistently increased activity or energy: No  Thoughts of hurting yourself or others: YES- Patient does have thoughts of hurting himself, but does not have a plan to do so.  History:  Recent stress or major life event: No  Prior depression or anxiety: Patient thinks he has had it forever but its gotten worse but has never been diagnosed with either depression or anxiety  Family history of depression or anxiety: No  Alcohol/drug use: YES- Socially drinks; only on the weekend; often 1/2 bottle in one setting   Difficulty sleeping: No  Precipitating or alleviating factors: None  Therapies tried and outcome: none  Daughter and wife have noticed.         2/26/2018     8:42  AM 2/3/2025     2:37 PM   PHQ   PHQ-9 Total Score 0 8    Q9: Thoughts of better off dead/self-harm past 2 weeks Not at all Several days   F/U: Thoughts of suicide or self-harm  Yes   F/U: Self harm-plan  No   F/U: Self-harm action  No   F/U: Safety concerns  Yes       Patient-reported         2/26/2018     8:42 AM 2/3/2025     2:40 PM   VIVI-7 SCORE   Total Score  3 (minimal anxiety)   Total Score 0 3        Patient-reported           Review of Systems  CONSTITUTIONAL: NEGATIVE for fever, chills, change in weight  INTEGUMENTARY/SKIN: NEGATIVE for worrisome rashes, moles or lesions  EYES: NEGATIVE for vision changes or irritation  ENT/MOUTH: NEGATIVE for ear, mouth and throat problems  RESP: NEGATIVE for significant cough or SOB  CV: NEGATIVE for chest pain, palpitations or peripheral edema  GI: NEGATIVE for nausea, abdominal pain, heartburn, or change in bowel habits  : NEGATIVE for frequency, dysuria, or hematuria  MUSCULOSKELETAL: NEGATIVE for significant arthralgias or myalgia  NEURO: NEGATIVE for weakness, dizziness or paresthesias  ENDOCRINE: NEGATIVE for temperature intolerance, skin/hair changes  HEME: NEGATIVE for bleeding problems  PSYCHIATRIC: NEGATIVE for changes in mood or affect      Objective    BP (!) 142/84 (BP Location: Right arm, Patient Position: Sitting, Cuff Size: Adult Large)   Pulse 72   Temp 98.7  F (37.1  C) (Tympanic)   Resp 18   Wt 105.1 kg (231 lb 12.8 oz)   SpO2 97%   BMI 33.26 kg/m    Body mass index is 33.26 kg/m .  Physical Exam   GENERAL: alert and no distress  EYES: Eyes grossly normal to inspection, PERRL and conjunctivae and sclerae normal  HENT: ear canals and TM's normal, nose and mouth without ulcers or lesions  NECK: no adenopathy, no asymmetry, masses, or scars  RESP: lungs clear to auscultation - no rales, rhonchi or wheezes  CV: regular rate and rhythm, no murmur, click or rub, no peripheral edema  ABDOMEN: soft, nontender, no hepatosplenomegaly, no masses and bowel  sounds normal  MS: no gross musculoskeletal defects noted, no edema  NEURO: Normal strength and tone, mentation intact and speech normal  PSYCH: mentation appears normal, affect normal/bright    Labs in process        Signed Electronically by: Rachel Valladares NP

## 2025-02-03 NOTE — TELEPHONE ENCOUNTER
8:20 AM    Reason for Call: OVERBOOK    Patient is having the following symptoms: Spouse if calling   for  who needs to be seen for depression.  days.    The patient is requesting an appointment for [facility] with Rachel Valladares.    Was an appointment offered for this call? No  If yes : Appointment type              Date  03/12/25    Preferred method for responding to this message: Telephone Call  What is your phone number ?   [phone]     If we cannot reach you directly, may we leave a detailed response at the number you provided? Yes    Can this message wait until your PCP/provider returns, if unavailable today? Provider is in    Paige Fox

## 2025-02-05 LAB — VIT D+METAB SERPL-MCNC: 25 NG/ML (ref 20–50)

## 2025-04-14 DIAGNOSIS — M10.00 IDIOPATHIC GOUT, UNSPECIFIED CHRONICITY, UNSPECIFIED SITE: ICD-10-CM

## 2025-04-14 RX ORDER — INDOMETHACIN 50 MG/1
CAPSULE ORAL
Qty: 20 CAPSULE | Refills: 0 | Status: SHIPPED | OUTPATIENT
Start: 2025-04-14

## 2025-04-14 NOTE — TELEPHONE ENCOUNTER
[facility]      Last Written Prescription Date:  4/4/2023  Last Fill Quantity: 20,   # refills: 1  Last Office Visit: 2/3/2025  Future Office visit:    Next 5 appointments (look out 90 days)      Apr 23, 2025 3:00 PM  (Arrive by 2:45 PM)  Provider Visit with Rachel Valladares NP  [facility] ([facility] [place] ) [address]  [place] [address]  [phone]             Routing refill request to provider for review/approval because:

## 2025-04-21 NOTE — PROGRESS NOTES
Assessment & Plan     (F32.A) Mild episode of depression  (primary encounter diagnosis)  (F41.9) Anxiety  Plan:   Well controlled. C/W current medications.   venlafaxine (EFFEXOR XR) 37.5 MG 24 hr capsule    (R79.89) Elevated LFTs  Plan:   Has reduced alcohol intake since last visit. Will recheck CMP today and update patient accordingly.  Comprehensive metabolic panel (BMP + Alb, Alk  Phos, ALT, AST, Total. Bili, TP)    (R03.0) Elevated blood pressure reading without diagnosis of hypertension  Plan:   /80, reduced from last visit.  Will check again at next visit in 1 year and re-evaluate.    (Z72.0) Tobacco abuse  Plan:   Encouraged cessation, he would like Quit Plan resources.  Quit Partner (Tobacco Cessation) Referral    (M10.00) Idiopathic gout, unspecified chronicity, unspecified site  Plan: Just getting over gout flare. Refilled indomethacin (INDOCIN) 50 MG capsule to have on hand.     The longitudinal plan of care for the diagnosis(es)/condition(s) as documented were addressed during this visit. Due to the added complexity in care, I will continue to support Casey in the subsequent management and with ongoing continuity of care.    Subjective   Casey is a 39 year old, presenting for the following health issues:  Depression and Anxiety    HPI      Depression and Anxiety      Seen on 2/3/25. Effexor started for worsening anxiety and depression.      How are you doing with your depression since your last visit? Improved   How are you doing with your anxiety since your last visit?  Improved   Are you having other symptoms that might be associated with depression or anxiety? No  Have you had a significant life event? No        Do you have any concerns with your use of alcohol or other drugs? No  No thoughts of self harm.     Effexor 37.5 mg daily, tolerating well. Notes significant improvement in mood, no SH/SI/HI.    Elevated BP  Caffeine- one cup of coffee today.   Tobacco- 1 ppd. He is interested in quit  plan resources today.    Alcohol intake- States he has cut back. Drinks mixed drink with rum. Does not measure pours. Will have 3-4 over the course of the week. At last visit, AST/ALT were elevated, will recheck today.       Social History               Tobacco Use    Smoking status: Every Day       Current packs/day: 1.00       Types: Cigarettes       Passive exposure: Current    Smokeless tobacco: Never   Vaping Use    Vaping status: Never Used   Substance Use Topics    Alcohol use: Yes       Comment: occ    Drug use: Never              2/26/2018     8:42 AM 2/3/2025     2:37 PM   PHQ   PHQ-9 Total Score 0 8    Q9: Thoughts of better off dead/self-harm past 2 weeks Not at all Several days   F/U: Thoughts of suicide or self-harm   Yes   F/U: Self harm-plan   No   F/U: Self-harm action   No   F/U: Safety concerns   Yes       Patient-reported           2/26/2018     8:42 AM 2/3/2025     2:40 PM   VIVI-7 SCORE   Total Score   3 (minimal anxiety)   Total Score 0 3        Patient-reported          4/23/2025     2:46 PM   Last PHQ-9   1.  Little interest or pleasure in doing things 0   2.  Feeling down, depressed, or hopeless 0   3.  Trouble falling or staying asleep, or sleeping too much 0   4.  Feeling tired or having little energy 0   5.  Poor appetite or overeating 0   6.  Feeling bad about yourself 0   7.  Trouble concentrating 0   8.  Moving slowly or restless 0   Q9: Thoughts of better off dead/self-harm past 2 weeks 0   PHQ-9 Total Score 0        Patient-reported         4/23/2025     2:49 PM   VIVI-7    1. Feeling nervous, anxious, or on edge 0   2. Not being able to stop or control worrying 0   3. Worrying too much about different things 0   4. Trouble relaxing 0   5. Being so restless that it is hard to sit still 0   6. Becoming easily annoyed or irritable 0   7. Feeling afraid, as if something awful might happen 0   VIVI-7 Total Score 0    If you checked any problems, how difficult have they made it for you to  "do your work, take care of things at home, or get along with other people? Not difficult at all       Patient-reported         Review of Systems  Constitutional, HEENT, cardiovascular, pulmonary, gi and gu systems are negative, except as otherwise noted.      Objective    /80 (BP Location: Left arm, Patient Position: Sitting, Cuff Size: Adult Large)   Pulse 72   Temp 97.6  F (36.4  C) (Tympanic)   Resp 20   Ht 1.778 m (5' 10\")   Wt 108 kg (238 lb)   SpO2 97%   BMI 34.15 kg/m    Body mass index is 34.15 kg/m .  Physical Exam   GENERAL: alert and no distress  NECK: no adenopathy, no asymmetry, masses, or scars  RESP: lungs clear to auscultation - no rales, rhonchi or wheezes  CV: regular rate and rhythm, no murmur, click or rub, no peripheral edema  ABDOMEN: soft, nontender, no hepatosplenomegaly, no masses and bowel sounds normal  MS: no gross musculoskeletal defects noted, no edema    ARETHA Braun     I was present with the student who participated in the service and in the documentation of the note. I have verified the history and personally performed the physical exam and medical decision making. I agree with the assessment and plan of care as documented in the note.       Signed Electronically by: Rachel Valladares NP    Answers submitted by the patient for this visit:  Patient Health Questionnaire (Submitted on 4/23/2025)  If you checked off any problems, how difficult have these problems made it for you to do your work, take care of things at home, or get along with other people?: Not difficult at all  PHQ9 TOTAL SCORE: 0  Patient Health Questionnaire (G7) (Submitted on 4/23/2025)  VIVI 7 TOTAL SCORE: 0    "

## 2025-04-23 ENCOUNTER — OFFICE VISIT (OUTPATIENT)
Dept: FAMILY MEDICINE | Facility: OTHER | Age: 40
End: 2025-04-23
Attending: NURSE PRACTITIONER
Payer: COMMERCIAL

## 2025-04-23 VITALS
OXYGEN SATURATION: 97 % | HEIGHT: 70 IN | RESPIRATION RATE: 20 BRPM | WEIGHT: 238 LBS | TEMPERATURE: 97.6 F | SYSTOLIC BLOOD PRESSURE: 136 MMHG | DIASTOLIC BLOOD PRESSURE: 80 MMHG | BODY MASS INDEX: 34.07 KG/M2 | HEART RATE: 72 BPM

## 2025-04-23 DIAGNOSIS — F32.A MILD EPISODE OF DEPRESSION: Primary | ICD-10-CM

## 2025-04-23 DIAGNOSIS — F41.9 ANXIETY: ICD-10-CM

## 2025-04-23 DIAGNOSIS — R79.89 ELEVATED LFTS: ICD-10-CM

## 2025-04-23 DIAGNOSIS — Z72.0 TOBACCO ABUSE: ICD-10-CM

## 2025-04-23 DIAGNOSIS — R03.0 ELEVATED BLOOD PRESSURE READING WITHOUT DIAGNOSIS OF HYPERTENSION: ICD-10-CM

## 2025-04-23 DIAGNOSIS — M10.00 IDIOPATHIC GOUT, UNSPECIFIED CHRONICITY, UNSPECIFIED SITE: ICD-10-CM

## 2025-04-23 LAB
ALBUMIN SERPL BCG-MCNC: 4.3 G/DL (ref 3.5–5.2)
ALP SERPL-CCNC: 92 U/L (ref 40–150)
ALT SERPL W P-5'-P-CCNC: 24 U/L (ref 0–70)
ANION GAP SERPL CALCULATED.3IONS-SCNC: 11 MMOL/L (ref 7–15)
AST SERPL W P-5'-P-CCNC: 25 U/L (ref 0–45)
BILIRUB SERPL-MCNC: 0.4 MG/DL
BUN SERPL-MCNC: 16.4 MG/DL (ref 6–20)
CALCIUM SERPL-MCNC: 9.5 MG/DL (ref 8.8–10.4)
CHLORIDE SERPL-SCNC: 103 MMOL/L (ref 98–107)
CREAT SERPL-MCNC: 1.14 MG/DL (ref 0.67–1.17)
EGFRCR SERPLBLD CKD-EPI 2021: 83 ML/MIN/1.73M2
GLUCOSE SERPL-MCNC: 92 MG/DL (ref 70–99)
HCO3 SERPL-SCNC: 26 MMOL/L (ref 22–29)
POTASSIUM SERPL-SCNC: 4.3 MMOL/L (ref 3.4–5.3)
PROT SERPL-MCNC: 7.4 G/DL (ref 6.4–8.3)
SODIUM SERPL-SCNC: 140 MMOL/L (ref 135–145)

## 2025-04-23 RX ORDER — INDOMETHACIN 50 MG/1
50 CAPSULE ORAL 2 TIMES DAILY WITH MEALS
Qty: 20 CAPSULE | Refills: 0 | Status: SHIPPED | OUTPATIENT
Start: 2025-04-23

## 2025-04-23 RX ORDER — VENLAFAXINE HYDROCHLORIDE 37.5 MG/1
37.5 CAPSULE, EXTENDED RELEASE ORAL DAILY
Qty: 90 CAPSULE | Refills: 3 | Status: SHIPPED | OUTPATIENT
Start: 2025-04-23

## 2025-04-23 ASSESSMENT — ANXIETY QUESTIONNAIRES
2. NOT BEING ABLE TO STOP OR CONTROL WORRYING: NOT AT ALL
7. FEELING AFRAID AS IF SOMETHING AWFUL MIGHT HAPPEN: NOT AT ALL
6. BECOMING EASILY ANNOYED OR IRRITABLE: NOT AT ALL
7. FEELING AFRAID AS IF SOMETHING AWFUL MIGHT HAPPEN: NOT AT ALL
GAD7 TOTAL SCORE: 0
1. FEELING NERVOUS, ANXIOUS, OR ON EDGE: NOT AT ALL
4. TROUBLE RELAXING: NOT AT ALL
3. WORRYING TOO MUCH ABOUT DIFFERENT THINGS: NOT AT ALL
5. BEING SO RESTLESS THAT IT IS HARD TO SIT STILL: NOT AT ALL
GAD7 TOTAL SCORE: 0
8. IF YOU CHECKED OFF ANY PROBLEMS, HOW DIFFICULT HAVE THESE MADE IT FOR YOU TO DO YOUR WORK, TAKE CARE OF THINGS AT HOME, OR GET ALONG WITH OTHER PEOPLE?: NOT DIFFICULT AT ALL
IF YOU CHECKED OFF ANY PROBLEMS ON THIS QUESTIONNAIRE, HOW DIFFICULT HAVE THESE PROBLEMS MADE IT FOR YOU TO DO YOUR WORK, TAKE CARE OF THINGS AT HOME, OR GET ALONG WITH OTHER PEOPLE: NOT DIFFICULT AT ALL
GAD7 TOTAL SCORE: 0

## 2025-04-23 ASSESSMENT — PATIENT HEALTH QUESTIONNAIRE - PHQ9
10. IF YOU CHECKED OFF ANY PROBLEMS, HOW DIFFICULT HAVE THESE PROBLEMS MADE IT FOR YOU TO DO YOUR WORK, TAKE CARE OF THINGS AT HOME, OR GET ALONG WITH OTHER PEOPLE: NOT DIFFICULT AT ALL
SUM OF ALL RESPONSES TO PHQ QUESTIONS 1-9: 0
SUM OF ALL RESPONSES TO PHQ QUESTIONS 1-9: 0

## 2025-04-23 ASSESSMENT — PAIN SCALES - GENERAL: PAINLEVEL_OUTOF10: NO PAIN (0)
